# Patient Record
Sex: FEMALE | Race: WHITE | Employment: PART TIME | URBAN - METROPOLITAN AREA
[De-identification: names, ages, dates, MRNs, and addresses within clinical notes are randomized per-mention and may not be internally consistent; named-entity substitution may affect disease eponyms.]

---

## 2019-09-24 ENCOUNTER — HOSPITAL ENCOUNTER (OUTPATIENT)
Dept: LAB | Age: 69
Discharge: HOME | End: 2019-09-24

## 2019-09-24 PROCEDURE — 88305 TISSUE EXAM BY PATHOLOGIST: CPT

## 2021-10-26 ENCOUNTER — HOSPITAL ENCOUNTER (EMERGENCY)
Age: 71
Discharge: HOME | End: 2021-10-26
Attending: EMERGENCY MEDICINE
Payer: MEDICARE

## 2021-10-26 ENCOUNTER — APPOINTMENT (OUTPATIENT)
Dept: GENERAL RADIOLOGY | Age: 71
End: 2021-10-26
Attending: EMERGENCY MEDICINE
Payer: MEDICARE

## 2021-10-26 VITALS
SYSTOLIC BLOOD PRESSURE: 122 MMHG | DIASTOLIC BLOOD PRESSURE: 58 MMHG | TEMPERATURE: 98.3 F | RESPIRATION RATE: 16 BRPM | OXYGEN SATURATION: 96 % | HEART RATE: 64 BPM | HEIGHT: 67 IN | BODY MASS INDEX: 20.88 KG/M2 | WEIGHT: 133 LBS

## 2021-10-26 DIAGNOSIS — R07.9 CHEST PAIN, UNSPECIFIED TYPE: Primary | ICD-10-CM

## 2021-10-26 LAB
ALBUMIN SERPL-MCNC: 3.3 G/DL (ref 3.2–4.6)
ALBUMIN/GLOB SERPL: 0.8 {RATIO} (ref 1.2–3.5)
ALP SERPL-CCNC: 58 U/L (ref 50–130)
ALT SERPL-CCNC: 26 U/L (ref 12–65)
ANION GAP SERPL CALC-SCNC: 6 MMOL/L (ref 7–16)
AST SERPL-CCNC: 45 U/L (ref 15–37)
ATRIAL RATE: 58 BPM
BASOPHILS # BLD: 0 K/UL (ref 0–0.2)
BASOPHILS NFR BLD: 0 % (ref 0–2)
BILIRUB SERPL-MCNC: 0.5 MG/DL (ref 0.2–1.1)
BUN SERPL-MCNC: 13 MG/DL (ref 8–23)
CALCIUM SERPL-MCNC: 8.9 MG/DL (ref 8.3–10.4)
CALCULATED P AXIS, ECG09: 73 DEGREES
CALCULATED R AXIS, ECG10: 77 DEGREES
CALCULATED T AXIS, ECG11: 71 DEGREES
CHLORIDE SERPL-SCNC: 106 MMOL/L (ref 98–107)
CO2 SERPL-SCNC: 29 MMOL/L (ref 21–32)
CREAT SERPL-MCNC: 0.85 MG/DL (ref 0.6–1)
DIAGNOSIS, 93000: NORMAL
DIFFERENTIAL METHOD BLD: ABNORMAL
EOSINOPHIL # BLD: 0.1 K/UL (ref 0–0.8)
EOSINOPHIL NFR BLD: 1 % (ref 0.5–7.8)
ERYTHROCYTE [DISTWIDTH] IN BLOOD BY AUTOMATED COUNT: 11.5 % (ref 11.9–14.6)
GLOBULIN SER CALC-MCNC: 4 G/DL (ref 2.3–3.5)
GLUCOSE SERPL-MCNC: 103 MG/DL (ref 65–100)
HCT VFR BLD AUTO: 40.3 % (ref 35.8–46.3)
HGB BLD-MCNC: 13.9 G/DL (ref 11.7–15.4)
IMM GRANULOCYTES # BLD AUTO: 0 K/UL (ref 0–0.5)
IMM GRANULOCYTES NFR BLD AUTO: 0 % (ref 0–5)
LIPASE SERPL-CCNC: 165 U/L (ref 73–393)
LYMPHOCYTES # BLD: 1.1 K/UL (ref 0.5–4.6)
LYMPHOCYTES NFR BLD: 22 % (ref 13–44)
MAGNESIUM SERPL-MCNC: 2.2 MG/DL (ref 1.8–2.4)
MCH RBC QN AUTO: 33.3 PG (ref 26.1–32.9)
MCHC RBC AUTO-ENTMCNC: 34.5 G/DL (ref 31.4–35)
MCV RBC AUTO: 96.6 FL (ref 79.6–97.8)
MONOCYTES # BLD: 0.5 K/UL (ref 0.1–1.3)
MONOCYTES NFR BLD: 11 % (ref 4–12)
NEUTS SEG # BLD: 3.3 K/UL (ref 1.7–8.2)
NEUTS SEG NFR BLD: 66 % (ref 43–78)
NRBC # BLD: 0 K/UL (ref 0–0.2)
P-R INTERVAL, ECG05: 200 MS
PLATELET # BLD AUTO: 262 K/UL (ref 150–450)
PMV BLD AUTO: 9.7 FL (ref 9.4–12.3)
POTASSIUM SERPL-SCNC: 4.2 MMOL/L (ref 3.5–5.1)
PROT SERPL-MCNC: 7.3 G/DL (ref 6.3–8.2)
Q-T INTERVAL, ECG07: 396 MS
QRS DURATION, ECG06: 92 MS
QTC CALCULATION (BEZET), ECG08: 388 MS
RBC # BLD AUTO: 4.17 M/UL (ref 4.05–5.2)
SODIUM SERPL-SCNC: 141 MMOL/L (ref 136–145)
TROPONIN-HIGH SENSITIVITY: 9 PG/ML (ref 0–14)
TROPONIN-HIGH SENSITIVITY: 9.4 PG/ML (ref 0–14)
VENTRICULAR RATE, ECG03: 58 BPM
WBC # BLD AUTO: 4.9 K/UL (ref 4.3–11.1)

## 2021-10-26 PROCEDURE — 83690 ASSAY OF LIPASE: CPT

## 2021-10-26 PROCEDURE — 83735 ASSAY OF MAGNESIUM: CPT

## 2021-10-26 PROCEDURE — 93005 ELECTROCARDIOGRAM TRACING: CPT

## 2021-10-26 PROCEDURE — 84484 ASSAY OF TROPONIN QUANT: CPT

## 2021-10-26 PROCEDURE — 71045 X-RAY EXAM CHEST 1 VIEW: CPT

## 2021-10-26 PROCEDURE — 94762 N-INVAS EAR/PLS OXIMTRY CONT: CPT

## 2021-10-26 PROCEDURE — 99284 EMERGENCY DEPT VISIT MOD MDM: CPT

## 2021-10-26 PROCEDURE — 85025 COMPLETE CBC W/AUTO DIFF WBC: CPT

## 2021-10-26 PROCEDURE — 80053 COMPREHEN METABOLIC PANEL: CPT

## 2021-10-26 RX ORDER — SODIUM CHLORIDE 0.9 % (FLUSH) 0.9 %
5-10 SYRINGE (ML) INJECTION AS NEEDED
Status: DISCONTINUED | OUTPATIENT
Start: 2021-10-26 | End: 2021-10-26 | Stop reason: HOSPADM

## 2021-10-26 RX ORDER — SODIUM CHLORIDE 0.9 % (FLUSH) 0.9 %
5-10 SYRINGE (ML) INJECTION EVERY 8 HOURS
Status: DISCONTINUED | OUTPATIENT
Start: 2021-10-26 | End: 2021-10-26 | Stop reason: HOSPADM

## 2021-10-26 RX ORDER — MONTELUKAST SODIUM 4 MG/1
1 TABLET, CHEWABLE ORAL DAILY
COMMUNITY

## 2021-10-26 RX ORDER — NITROGLYCERIN 0.4 MG/1
0.4 TABLET SUBLINGUAL
Status: DISCONTINUED | OUTPATIENT
Start: 2021-10-26 | End: 2021-10-26 | Stop reason: HOSPADM

## 2021-10-26 NOTE — ED PROVIDER NOTES
[address] Kathleen Alvarez is a 73 y.o. female seen on 10/26/2021 in the [facility] EMERGENCY DEPT in room ER01/01. Chief Complaint   Patient presents with    Chest Pain     HPI: 70-year-old  female presented to the emergency department for evaluation with complaints of chest pain that began approximately 3 hours prior to arrival.  Patient states that the pain awoke her this morning and it was in the center of her chest and radiating to her back. She did start to hyperventilate secondary to the pain. She denies being short of breath. She denies any nausea, sweating, shortness of breath, vomiting, abdominal pain or any other associated symptoms with the chest pain that she had. She did call EMS at that time and they evaluated patient. Patient was given 324 aspirin and 1 nitroglycerin with relief of her pain. She was encouraged to come to the emergency department however declined transport. She called her family doctor to be seen and they recommended that the patient come to the emergency department for further evaluation. Patient has had similar episodes in the past but it has been years ago. She is unsure what the etiology of her symptoms were previously. She does not have a history of CAD nor family history. She has no other complaints at this time. She is chest pain-free currently. Historian: Patient    REVIEW OF SYSTEMS     Review of Systems   Constitutional: Negative. HENT: Negative. Respiratory: Negative. Cardiovascular: Positive for chest pain. Gastrointestinal: Negative. Genitourinary: Negative. Musculoskeletal: Negative. Skin: Negative. Neurological: Negative. Psychiatric/Behavioral: Negative. All other systems reviewed and are negative.       PAST MEDICAL HISTORY     Past Medical History:   Diagnosis Date    Gastrointestinal disorder     diverticulitis    Other ill-defined conditions     chronic back pain     Past Surgical History:   Procedure Laterality Date    ABDOMEN SURGERY PROC UNLISTED      part of colon removed    HX GI      part of colon removed    HX HYSTERECTOMY       Social History     Socioeconomic History    Marital status:      Spouse name: Not on file    Number of children: Not on file    Years of education: Not on file    Highest education level: Not on file   Tobacco Use    Smoking status: Never Smoker    Smokeless tobacco: Never Used   Substance and Sexual Activity    Alcohol use: Yes     Comment: glass a wine a night    Drug use: No     Social Determinants of Health     Financial Resource Strain:     Difficulty of Paying Living Expenses:    Food Insecurity:     Worried About Running Out of Food in the Last Year:     [address] in the Last Year:    Transportation Needs:     Lack of Transportation (Medical):  Lack of Transportation (Non-Medical):    Physical Activity:     Days of Exercise per Week:     Minutes of Exercise per Session:    Stress:     Feeling of Stress :    Social Connections:     Frequency of Communication with Friends and Family:     Frequency of Social Gatherings with Friends and Family:     Attends [employer] Services:     Active Member of Clubs or Organizations:     Attends Club or Organization Meetings:     Marital Status:      Prior to Admission Medications   Prescriptions Last Dose Informant Patient Reported? Taking?   estradiol (ESTRACE) 1 mg tablet   Yes No   Sig: Take 1 mg by mouth daily. eszopiclone (LUNESTA) 1 mg tablet   Yes No   Sig: Take 1 mg by mouth nightly.   gabapentin (NEURONTIN) 300 mg tablet   Yes No   Sig: Take  by mouth three (3) times daily. meloxicam (MOBIC) 15 mg tablet   Yes No   Sig: Take 15 mg by mouth daily.         Facility-Administered Medications: None     Allergies   Allergen Reactions    Other Medication Other (comments)     Patient stated \"Blue Sutures\"    Penicillins Other (comments)     unsure        PHYSICAL EXAM       Vitals:    10/26/21 1138 10/26/21 1218 10/26/21 1318 10/26/21 1400   BP: (!) 107/55 (!) 119/56 (!) 120/57 (!) 122/58   Pulse: 60 (!) 58 (!) 56 64   Resp:    16   Temp:    98.3 °F (36.8 °C)   SpO2: 95% 95% 94% 96%    Vital signs were reviewed. Physical Exam  Vitals and nursing note reviewed. Constitutional:       General: She is not in acute distress. Appearance: She is well-developed. She is not ill-appearing or toxic-appearing. HENT:      Head: Normocephalic and atraumatic. Cardiovascular:      Rate and Rhythm: Normal rate and regular rhythm. Heart sounds: Normal heart sounds. Pulmonary:      Effort: Pulmonary effort is normal.      Breath sounds: Normal breath sounds. Chest:      Chest wall: No tenderness. Abdominal:      Palpations: Abdomen is soft. Tenderness: There is no abdominal tenderness. Musculoskeletal:         General: Normal range of motion. Right lower leg: No tenderness. No edema. Left lower leg: No tenderness. No edema. Skin:     General: Skin is warm. Neurological:      General: No focal deficit present. Mental Status: She is alert and oriented to person, place, and time.    Psychiatric:         Mood and Affect: Mood normal.         Behavior: Behavior normal.          MEDICAL DECISION MAKING     ED Course:    Orders Placed This Encounter    XR CHEST PORT    Troponin - High Sensitivity    Troponin 2 Hour Repeat    CBC    CMP    LIPASE    Magnesium    Cardiac Monitoring    PULSE OXIMETRY CONTINUOUS    NURSING-MISCELLANEOUS: Please draw blue top tube and send to lab ONE TIME    EKG    SALINE LOCK IV ONE TIME Routine    INSERT PERIPHERAL IV ONE TIME STAT    sodium chloride (NS) flush 5-10 mL    sodium chloride (NS) flush 5-10 mL    nitroglycerin (NITROSTAT) tablet 0.4 mg    colestipoL (COLESTID) 1 gram tablet     Recent Results (from the past 8 hour(s))   EKG, 12 LEAD, INITIAL    Collection Time: 10/26/21 10:35 AM   Result Value Ref Range    Ventricular Rate 58 BPM    Atrial Rate 58 BPM    P-R Interval 200 ms    QRS Duration 92 ms    Q-T Interval 396 ms    QTC Calculation (Bezet) 388 ms    Calculated P Axis 73 degrees    Calculated R Axis 77 degrees    Calculated T Axis 71 degrees    Diagnosis       Sinus bradycardia  RSR' or QR pattern in V1 suggests right ventricular conduction delay  Borderline ECG  When compared with ECG of 22-AUG-2013 16:35,  No significant change was found  Confirmed by Saad Child ([id number]) on 10/26/2021 1:44:17 PM     TROPONIN-HIGH SENSITIVITY    Collection Time: 10/26/21 10:38 AM   Result Value Ref Range    Troponin-High Sensitivity 9.4 0 - 14 pg/mL   CBC WITH AUTOMATED DIFF    Collection Time: 10/26/21 10:38 AM   Result Value Ref Range    WBC 4.9 4.3 - 11.1 K/uL    RBC 4.17 4.05 - 5.2 M/uL    HGB 13.9 11.7 - 15.4 g/dL    HCT 40.3 35.8 - 46.3 %    MCV 96.6 79.6 - 97.8 FL    MCH 33.3 (H) 26.1 - 32.9 PG    MCHC 34.5 31.4 - 35.0 g/dL    RDW 11.5 (L) 11.9 - 14.6 %    PLATELET 897 226 - 759 K/uL    MPV 9.7 9.4 - 12.3 FL    ABSOLUTE NRBC 0.00 0.0 - 0.2 K/uL    DF AUTOMATED      NEUTROPHILS 66 43 - 78 %    LYMPHOCYTES 22 13 - 44 %    MONOCYTES 11 4.0 - 12.0 %    EOSINOPHILS 1 0.5 - 7.8 %    BASOPHILS 0 0.0 - 2.0 %    IMMATURE GRANULOCYTES 0 0.0 - 5.0 %    ABS. NEUTROPHILS 3.3 1.7 - 8.2 K/UL    ABS. LYMPHOCYTES 1.1 0.5 - 4.6 K/UL    ABS. MONOCYTES 0.5 0.1 - 1.3 K/UL    ABS. EOSINOPHILS 0.1 0.0 - 0.8 K/UL    ABS. BASOPHILS 0.0 0.0 - 0.2 K/UL    ABS. IMM.  GRANS. 0.0 0.0 - 0.5 K/UL   METABOLIC PANEL, COMPREHENSIVE    Collection Time: 10/26/21 10:38 AM   Result Value Ref Range    Sodium 141 136 - 145 mmol/L    Potassium 4.2 3.5 - 5.1 mmol/L    Chloride 106 98 - 107 mmol/L    CO2 29 21 - 32 mmol/L    Anion gap 6 (L) 7 - 16 mmol/L    Glucose 103 (H) 65 - 100 mg/dL    BUN 13 8 - 23 MG/DL    Creatinine 0.85 0.6 - 1.0 MG/DL    GFR est AA >60 >60 ml/min/1.73m2    GFR est non-AA >60 >60 ml/min/1.73m2    Calcium 8.9 8.3 - 10.4 MG/DL    Bilirubin, total 0.5 0.2 - 1.1 MG/DL    ALT (SGPT) 26 12 - 65 U/L    AST (SGOT) 45 (H) 15 - 37 U/L    Alk. phosphatase 58 50 - 130 U/L    Protein, total 7.3 6.3 - 8.2 g/dL    Albumin 3.3 3.2 - 4.6 g/dL    Globulin 4.0 (H) 2.3 - 3.5 g/dL    A-G Ratio 0.8 (L) 1.2 - 3.5     LIPASE    Collection Time: 10/26/21 10:38 AM   Result Value Ref Range    Lipase 165 73 - 393 U/L   MAGNESIUM    Collection Time: 10/26/21 10:38 AM   Result Value Ref Range    Magnesium 2.2 1.8 - 2.4 mg/dL   TROPONIN-HIGH SENSITIVITY    Collection Time: 10/26/21 12:51 PM   Result Value Ref Range    Troponin-High Sensitivity 9.0 0 - 14 pg/mL     XR CHEST PORT    Result Date: 10/26/2021  AP PORTABLE CHEST X-RAY 10/26/2021 2:01 PM HISTORY: CP  ER 1:-Mid-sternal chest pain radiating into the back since this morning COMPARISON: June 8, 2012 FINDINGS:  EKG leads are present. There is no lobar consolidation, pleural effusions or pulmonary edema. No consolidation. EKG interpretation personally: Rate 58. Sinus bradycardia. Borderline [place] interval.  Normal QT interval.      MDM  Number of Diagnoses or Management Options  Diagnosis management comments: 59-year-old  female presented emergency department with complaints of chest pain that woke her up from sleep. Patient without recurrence of chest pain in the emergency department. Patient's EKG, labs and imaging are reassuring. Patient encouraged to follow-up with her family doctor for further work-up. He will return the emergency department for any concerns. Amount and/or Complexity of Data Reviewed  Clinical lab tests: reviewed  Tests in the radiology section of CPT®: ordered and reviewed  Independent visualization of images, tracings, or specimens: yes    Patient Progress  Patient progress: improved        Disposition: Discharged  Diagnosis:     ICD-10-CM ICD-9-CM   1.  Chest pain, unspecified type  R07.9 786.50 ____________________________________________________________________  A portion of this note was generated using voice recognition dictation software. While the note has been reviewed for accuracy, please note certain words and phrases may not be transcribed as intended and some grammatical and/or typographical errors may be present.

## 2021-10-26 NOTE — ED NOTES
I have reviewed discharge instructions with the patient and spouse. The patient and spouse verbalized understanding. Patient left ED via Discharge Method: ambulatory to Home with Van Thorne, her . Opportunity for questions and clarification provided. Patient given 0 scripts. To continue your aftercare when you leave the hospital, you may receive an automated call from our care team to check in on how you are doing. This is a free service and part of our promise to provide the best care and service to meet your aftercare needs.  If you have questions, or wish to unsubscribe from this service please call [phone]. Thank you for Choosing our [facility] Emergency Department.

## 2021-10-26 NOTE — ED TRIAGE NOTES
Pt states chest pain mid sternal radiating to the back started at 8 am. Was seen by EMS at 8:30 and was given 324 of baby aspirin and nitro. It made it feel better and declined transport. States chest pain returned 30 mins prior to arrival.  No Hx of heart problems. Flu shot yesterday.  Masked

## 2022-05-27 ENCOUNTER — HOSPITAL ENCOUNTER (INPATIENT)
Age: 72
LOS: 3 days | Discharge: HOME | DRG: 193 | End: 2022-05-30
Attending: EMERGENCY MEDICINE
Payer: MEDICARE

## 2022-05-27 ENCOUNTER — HOSPITAL ENCOUNTER (EMERGENCY)
Dept: CT IMAGING | Age: 72
Discharge: HOME | DRG: 193 | End: 2022-05-30
Payer: MEDICARE

## 2022-05-27 ENCOUNTER — HOSPITAL ENCOUNTER (EMERGENCY)
Dept: GENERAL RADIOLOGY | Age: 72
Discharge: HOME | DRG: 193 | End: 2022-05-30
Payer: MEDICARE

## 2022-05-27 DIAGNOSIS — J18.9 COMMUNITY ACQUIRED PNEUMONIA OF LEFT LOWER LOBE OF LUNG: Primary | ICD-10-CM

## 2022-05-27 DIAGNOSIS — J96.01 ACUTE HYPOXEMIC RESPIRATORY FAILURE (HCC): ICD-10-CM

## 2022-05-27 PROBLEM — J16.0 CAP (COMMUNITY ACQUIRED PNEUMONIA) DUE TO CHLAMYDIA SPECIES: Status: ACTIVE | Noted: 2022-05-27

## 2022-05-27 LAB
ALBUMIN SERPL-MCNC: 4.1 G/DL (ref 3.2–4.6)
ALBUMIN/GLOB SERPL: 0.9 {RATIO} (ref 1.2–3.5)
ALP SERPL-CCNC: 71 U/L (ref 50–130)
ALT SERPL-CCNC: 32 U/L (ref 12–65)
ANION GAP SERPL CALC-SCNC: 7 MMOL/L (ref 7–16)
AST SERPL-CCNC: 45 U/L (ref 15–37)
BASOPHILS # BLD: 0 K/UL (ref 0–0.2)
BASOPHILS NFR BLD: 0 % (ref 0–2)
BILIRUB SERPL-MCNC: 0.5 MG/DL (ref 0.2–1.1)
BUN SERPL-MCNC: 9 MG/DL (ref 8–23)
CALCIUM SERPL-MCNC: 9.5 MG/DL (ref 8.3–10.4)
CHLORIDE SERPL-SCNC: 102 MMOL/L (ref 98–107)
CO2 SERPL-SCNC: 30 MMOL/L (ref 21–32)
CREAT SERPL-MCNC: 0.88 MG/DL (ref 0.6–1)
D DIMER PPP FEU-MCNC: 0.81 UG/ML(FEU)
DIFFERENTIAL METHOD BLD: ABNORMAL
EOSINOPHIL # BLD: 0 K/UL (ref 0–0.8)
EOSINOPHIL NFR BLD: 0 % (ref 0.5–7.8)
ERYTHROCYTE [DISTWIDTH] IN BLOOD BY AUTOMATED COUNT: 11.5 % (ref 11.9–14.6)
GLOBULIN SER CALC-MCNC: 4.4 G/DL (ref 2.3–3.5)
GLUCOSE SERPL-MCNC: 80 MG/DL (ref 65–100)
HCT VFR BLD AUTO: 42.9 % (ref 35.8–46.3)
HGB BLD-MCNC: 14.7 G/DL (ref 11.7–15.4)
IMM GRANULOCYTES # BLD AUTO: 0 K/UL (ref 0–0.5)
IMM GRANULOCYTES NFR BLD AUTO: 0 % (ref 0–5)
LACTATE SERPL-SCNC: 0.7 MMOL/L (ref 0.4–2)
LYMPHOCYTES # BLD: 1 K/UL (ref 0.5–4.6)
LYMPHOCYTES NFR BLD: 21 % (ref 13–44)
MCH RBC QN AUTO: 32.6 PG (ref 26.1–32.9)
MCHC RBC AUTO-ENTMCNC: 34.3 G/DL (ref 31.4–35)
MCV RBC AUTO: 95.1 FL (ref 79.6–97.8)
MONOCYTES # BLD: 0.6 K/UL (ref 0.1–1.3)
MONOCYTES NFR BLD: 12 % (ref 4–12)
NEUTS SEG # BLD: 3.2 K/UL (ref 1.7–8.2)
NEUTS SEG NFR BLD: 66 % (ref 43–78)
NRBC # BLD: 0 K/UL (ref 0–0.2)
PLATELET # BLD AUTO: 266 K/UL (ref 150–450)
PMV BLD AUTO: 9.6 FL (ref 9.4–12.3)
POTASSIUM SERPL-SCNC: 3.6 MMOL/L (ref 3.5–5.1)
PROCALCITONIN SERPL-MCNC: <0.05 NG/ML (ref 0–0.49)
PROT SERPL-MCNC: 8.5 G/DL (ref 6.3–8.2)
RBC # BLD AUTO: 4.51 M/UL (ref 4.05–5.2)
SODIUM SERPL-SCNC: 139 MMOL/L (ref 136–145)
WBC # BLD AUTO: 4.9 K/UL (ref 4.3–11.1)

## 2022-05-27 PROCEDURE — 96374 THER/PROPH/DIAG INJ IV PUSH: CPT

## 2022-05-27 PROCEDURE — 94640 AIRWAY INHALATION TREATMENT: CPT

## 2022-05-27 PROCEDURE — 94760 N-INVAS EAR/PLS OXIMETRY 1: CPT

## 2022-05-27 PROCEDURE — 96365 THER/PROPH/DIAG IV INF INIT: CPT

## 2022-05-27 PROCEDURE — 1100000000 HC RM PRIVATE

## 2022-05-27 PROCEDURE — 71260 CT THORAX DX C+: CPT

## 2022-05-27 PROCEDURE — 94664 DEMO&/EVAL PT USE INHALER: CPT

## 2022-05-27 PROCEDURE — 6370000000 HC RX 637 (ALT 250 FOR IP): Performed by: FAMILY MEDICINE

## 2022-05-27 PROCEDURE — 80053 COMPREHEN METABOLIC PANEL: CPT

## 2022-05-27 PROCEDURE — 6360000004 HC RX CONTRAST MEDICATION: Performed by: EMERGENCY MEDICINE

## 2022-05-27 PROCEDURE — 87040 BLOOD CULTURE FOR BACTERIA: CPT

## 2022-05-27 PROCEDURE — 6370000000 HC RX 637 (ALT 250 FOR IP): Performed by: EMERGENCY MEDICINE

## 2022-05-27 PROCEDURE — 99285 EMERGENCY DEPT VISIT HI MDM: CPT

## 2022-05-27 PROCEDURE — 2580000003 HC RX 258: Performed by: FAMILY MEDICINE

## 2022-05-27 PROCEDURE — 85025 COMPLETE CBC W/AUTO DIFF WBC: CPT

## 2022-05-27 PROCEDURE — 83605 ASSAY OF LACTIC ACID: CPT

## 2022-05-27 PROCEDURE — 85379 FIBRIN DEGRADATION QUANT: CPT

## 2022-05-27 PROCEDURE — 6360000002 HC RX W HCPCS: Performed by: EMERGENCY MEDICINE

## 2022-05-27 PROCEDURE — 2580000003 HC RX 258: Performed by: EMERGENCY MEDICINE

## 2022-05-27 PROCEDURE — 84145 PROCALCITONIN (PCT): CPT

## 2022-05-27 PROCEDURE — 71046 X-RAY EXAM CHEST 2 VIEWS: CPT

## 2022-05-27 PROCEDURE — 93005 ELECTROCARDIOGRAM TRACING: CPT | Performed by: EMERGENCY MEDICINE

## 2022-05-27 PROCEDURE — 96375 TX/PRO/DX INJ NEW DRUG ADDON: CPT

## 2022-05-27 RX ORDER — 0.9 % SODIUM CHLORIDE 0.9 %
100 INTRAVENOUS SOLUTION INTRAVENOUS ONCE
Status: DISCONTINUED | OUTPATIENT
Start: 2022-05-27 | End: 2022-05-31 | Stop reason: HOSPADM

## 2022-05-27 RX ORDER — AZITHROMYCIN 250 MG/1
250 TABLET, FILM COATED ORAL DAILY
Status: DISCONTINUED | OUTPATIENT
Start: 2022-05-28 | End: 2022-05-30 | Stop reason: HOSPADM

## 2022-05-27 RX ORDER — CHOLESTYRAMINE LIGHT 4 G/5.7G
4 POWDER, FOR SUSPENSION ORAL DAILY
Status: DISCONTINUED | OUTPATIENT
Start: 2022-05-28 | End: 2022-05-29

## 2022-05-27 RX ORDER — FAMOTIDINE 20 MG/1
20 TABLET, FILM COATED ORAL 2 TIMES DAILY
Status: DISCONTINUED | OUTPATIENT
Start: 2022-05-27 | End: 2022-05-30 | Stop reason: HOSPADM

## 2022-05-27 RX ORDER — ONDANSETRON 4 MG/1
4 TABLET, ORALLY DISINTEGRATING ORAL EVERY 8 HOURS PRN
Status: DISCONTINUED | OUTPATIENT
Start: 2022-05-27 | End: 2022-05-30 | Stop reason: HOSPADM

## 2022-05-27 RX ORDER — SODIUM CHLORIDE 0.9 % (FLUSH) 0.9 %
5-40 SYRINGE (ML) INJECTION EVERY 12 HOURS SCHEDULED
Status: DISCONTINUED | OUTPATIENT
Start: 2022-05-27 | End: 2022-05-30 | Stop reason: HOSPADM

## 2022-05-27 RX ORDER — MORPHINE SULFATE 4 MG/ML
4 INJECTION INTRAVENOUS EVERY 4 HOURS PRN
Status: DISCONTINUED | OUTPATIENT
Start: 2022-05-27 | End: 2022-05-30 | Stop reason: HOSPADM

## 2022-05-27 RX ORDER — IPRATROPIUM BROMIDE AND ALBUTEROL SULFATE 2.5; .5 MG/3ML; MG/3ML
1 SOLUTION RESPIRATORY (INHALATION)
Status: COMPLETED | OUTPATIENT
Start: 2022-05-27 | End: 2022-05-27

## 2022-05-27 RX ORDER — SODIUM CHLORIDE, SODIUM LACTATE, POTASSIUM CHLORIDE, AND CALCIUM CHLORIDE .6; .31; .03; .02 G/100ML; G/100ML; G/100ML; G/100ML
1000 INJECTION, SOLUTION INTRAVENOUS ONCE
Status: COMPLETED | OUTPATIENT
Start: 2022-05-27 | End: 2022-05-27

## 2022-05-27 RX ORDER — SODIUM CHLORIDE 0.9 % (FLUSH) 0.9 %
5-40 SYRINGE (ML) INJECTION AS NEEDED
Status: CANCELLED | OUTPATIENT
Start: 2022-05-27

## 2022-05-27 RX ORDER — OXYCODONE HYDROCHLORIDE 5 MG/1
5 TABLET ORAL EVERY 4 HOURS PRN
Status: DISCONTINUED | OUTPATIENT
Start: 2022-05-27 | End: 2022-05-29

## 2022-05-27 RX ORDER — SODIUM CHLORIDE 0.9 % (FLUSH) 0.9 %
5-40 SYRINGE (ML) INJECTION PRN
Status: DISCONTINUED | OUTPATIENT
Start: 2022-05-27 | End: 2022-05-30 | Stop reason: HOSPADM

## 2022-05-27 RX ORDER — POTASSIUM CHLORIDE 7.45 MG/ML
10 INJECTION INTRAVENOUS PRN
Status: DISCONTINUED | OUTPATIENT
Start: 2022-05-27 | End: 2022-05-30 | Stop reason: HOSPADM

## 2022-05-27 RX ORDER — ONDANSETRON 2 MG/ML
4 INJECTION INTRAMUSCULAR; INTRAVENOUS EVERY 6 HOURS PRN
Status: DISCONTINUED | OUTPATIENT
Start: 2022-05-27 | End: 2022-05-30 | Stop reason: HOSPADM

## 2022-05-27 RX ORDER — POLYETHYLENE GLYCOL 3350 17 G/17G
17 POWDER, FOR SOLUTION ORAL DAILY PRN
Status: DISCONTINUED | OUTPATIENT
Start: 2022-05-27 | End: 2022-05-30 | Stop reason: HOSPADM

## 2022-05-27 RX ORDER — METHYLPREDNISOLONE SODIUM SUCCINATE 125 MG/2ML
125 INJECTION, POWDER, LYOPHILIZED, FOR SOLUTION INTRAMUSCULAR; INTRAVENOUS
Status: COMPLETED | OUTPATIENT
Start: 2022-05-27 | End: 2022-05-27

## 2022-05-27 RX ORDER — ACETAMINOPHEN 325 MG/1
650 TABLET ORAL EVERY 6 HOURS PRN
Status: DISCONTINUED | OUTPATIENT
Start: 2022-05-27 | End: 2022-05-30 | Stop reason: HOSPADM

## 2022-05-27 RX ORDER — SODIUM CHLORIDE 0.9 % (FLUSH) 0.9 %
5-40 SYRINGE (ML) INJECTION EVERY 8 HOURS
Status: CANCELLED | OUTPATIENT
Start: 2022-05-27

## 2022-05-27 RX ORDER — PROCHLORPERAZINE MALEATE 10 MG
10 TABLET ORAL EVERY 6 HOURS PRN
Status: DISCONTINUED | OUTPATIENT
Start: 2022-05-27 | End: 2022-05-30 | Stop reason: HOSPADM

## 2022-05-27 RX ORDER — ZOLPIDEM TARTRATE 5 MG/1
5 TABLET ORAL NIGHTLY PRN
Status: DISCONTINUED | OUTPATIENT
Start: 2022-05-27 | End: 2022-05-29

## 2022-05-27 RX ORDER — MAGNESIUM SULFATE IN WATER 40 MG/ML
2000 INJECTION, SOLUTION INTRAVENOUS PRN
Status: DISCONTINUED | OUTPATIENT
Start: 2022-05-27 | End: 2022-05-30 | Stop reason: HOSPADM

## 2022-05-27 RX ORDER — SODIUM CHLORIDE 9 MG/ML
INJECTION, SOLUTION INTRAVENOUS PRN
Status: DISCONTINUED | OUTPATIENT
Start: 2022-05-27 | End: 2022-05-30 | Stop reason: HOSPADM

## 2022-05-27 RX ORDER — ACETAMINOPHEN 650 MG/1
650 SUPPOSITORY RECTAL EVERY 6 HOURS PRN
Status: DISCONTINUED | OUTPATIENT
Start: 2022-05-27 | End: 2022-05-30 | Stop reason: HOSPADM

## 2022-05-27 RX ORDER — OXYCODONE AND ACETAMINOPHEN 7.5; 325 MG/1; MG/1
1 TABLET ORAL
Status: COMPLETED | OUTPATIENT
Start: 2022-05-27 | End: 2022-05-27

## 2022-05-27 RX ORDER — SODIUM CHLORIDE 0.9 % (FLUSH) 0.9 %
5-40 SYRINGE (ML) INJECTION 2 TIMES DAILY
Status: DISCONTINUED | OUTPATIENT
Start: 2022-05-27 | End: 2022-05-31 | Stop reason: HOSPADM

## 2022-05-27 RX ORDER — GABAPENTIN 300 MG/1
300 CAPSULE ORAL 3 TIMES DAILY
Status: DISCONTINUED | OUTPATIENT
Start: 2022-05-27 | End: 2022-05-29

## 2022-05-27 RX ORDER — GUAIFENESIN 600 MG/1
600 TABLET, EXTENDED RELEASE ORAL 2 TIMES DAILY
Status: DISCONTINUED | OUTPATIENT
Start: 2022-05-27 | End: 2022-05-30 | Stop reason: HOSPADM

## 2022-05-27 RX ORDER — ENOXAPARIN SODIUM 100 MG/ML
40 INJECTION SUBCUTANEOUS DAILY
Status: DISCONTINUED | OUTPATIENT
Start: 2022-05-28 | End: 2022-05-30 | Stop reason: HOSPADM

## 2022-05-27 RX ORDER — HYDROCODONE BITARTRATE AND HOMATROPINE METHYLBROMIDE ORAL SOLUTION 5; 1.5 MG/5ML; MG/5ML
5 LIQUID ORAL EVERY 4 HOURS PRN
Status: DISCONTINUED | OUTPATIENT
Start: 2022-05-27 | End: 2022-05-30 | Stop reason: HOSPADM

## 2022-05-27 RX ORDER — POTASSIUM CHLORIDE 20 MEQ/1
40 TABLET, EXTENDED RELEASE ORAL PRN
Status: DISCONTINUED | OUTPATIENT
Start: 2022-05-27 | End: 2022-05-30 | Stop reason: HOSPADM

## 2022-05-27 RX ADMIN — CEFTRIAXONE 1000 MG: 1 INJECTION, POWDER, FOR SOLUTION INTRAMUSCULAR; INTRAVENOUS at 19:34

## 2022-05-27 RX ADMIN — GUAIFENESIN 600 MG: 600 TABLET, EXTENDED RELEASE ORAL at 22:45

## 2022-05-27 RX ADMIN — IPRATROPIUM BROMIDE AND ALBUTEROL SULFATE 1 AMPULE: .5; 3 SOLUTION RESPIRATORY (INHALATION) at 16:21

## 2022-05-27 RX ADMIN — METHYLPREDNISOLONE SODIUM SUCCINATE 125 MG: 125 INJECTION, POWDER, FOR SOLUTION INTRAMUSCULAR; INTRAVENOUS at 16:30

## 2022-05-27 RX ADMIN — GABAPENTIN 300 MG: 300 CAPSULE ORAL at 22:45

## 2022-05-27 RX ADMIN — ONDANSETRON 4 MG: 4 TABLET, ORALLY DISINTEGRATING ORAL at 21:46

## 2022-05-27 RX ADMIN — SODIUM CHLORIDE, POTASSIUM CHLORIDE, SODIUM LACTATE AND CALCIUM CHLORIDE 1000 ML: 600; 310; 30; 20 INJECTION, SOLUTION INTRAVENOUS at 16:30

## 2022-05-27 RX ADMIN — AZITHROMYCIN MONOHYDRATE 500 MG: 500 INJECTION, POWDER, LYOPHILIZED, FOR SOLUTION INTRAVENOUS at 19:55

## 2022-05-27 RX ADMIN — PROCHLORPERAZINE MALEATE 10 MG: 10 TABLET ORAL at 23:25

## 2022-05-27 RX ADMIN — OXYCODONE HYDROCHLORIDE AND ACETAMINOPHEN 1 TABLET: 7.5; 325 TABLET ORAL at 18:33

## 2022-05-27 RX ADMIN — IOPAMIDOL 100 ML: 755 INJECTION, SOLUTION INTRAVENOUS at 18:03

## 2022-05-27 RX ADMIN — SODIUM CHLORIDE, PRESERVATIVE FREE 10 ML: 5 INJECTION INTRAVENOUS at 21:50

## 2022-05-27 RX ADMIN — FAMOTIDINE 20 MG: 20 TABLET, FILM COATED ORAL at 22:45

## 2022-05-27 ASSESSMENT — PAIN DESCRIPTION - PAIN TYPE
TYPE: CHRONIC PAIN
TYPE: ACUTE PAIN

## 2022-05-27 ASSESSMENT — PAIN SCALES - GENERAL
PAINLEVEL_OUTOF10: 6
PAINLEVEL_OUTOF10: 3
PAINLEVEL_OUTOF10: 10

## 2022-05-27 ASSESSMENT — ENCOUNTER SYMPTOMS
SHORTNESS OF BREATH: 1
DIARRHEA: 0
ABDOMINAL PAIN: 0
COUGH: 1
COLOR CHANGE: 0
STRIDOR: 0
NAUSEA: 0
VOMITING: 0

## 2022-05-27 ASSESSMENT — PAIN DESCRIPTION - ORIENTATION
ORIENTATION: OTHER (COMMENT)
ORIENTATION: LEFT

## 2022-05-27 ASSESSMENT — PAIN DESCRIPTION - FREQUENCY
FREQUENCY: CONTINUOUS
FREQUENCY: CONTINUOUS

## 2022-05-27 ASSESSMENT — PAIN - FUNCTIONAL ASSESSMENT: PAIN_FUNCTIONAL_ASSESSMENT: 0-10

## 2022-05-27 ASSESSMENT — PAIN DESCRIPTION - LOCATION
LOCATION: GENERALIZED
LOCATION: CHEST

## 2022-05-27 ASSESSMENT — PAIN DESCRIPTION - ONSET
ONSET: ON-GOING
ONSET: GRADUAL

## 2022-05-27 ASSESSMENT — PAIN DESCRIPTION - DESCRIPTORS
DESCRIPTORS: ACHING
DESCRIPTORS: CRAMPING;ACHING

## 2022-05-27 ASSESSMENT — LIFESTYLE VARIABLES: HOW OFTEN DO YOU HAVE A DRINK CONTAINING ALCOHOL: NEVER

## 2022-05-27 NOTE — ED TRIAGE NOTES
Pt states that she has had a cough for approx 1 week. Seen and treated at Urgent care on Wednesday for bronchitis. Given zithrmax and tessalon perles. States that she is no better and having left lower rib pain. O2 sats have been decreased.   Negative COVID and Flu test.  Masked on arrival

## 2022-05-27 NOTE — ED PROVIDER NOTES
[facility] Emergency Department Provider Note                   PCP:                Kale Benitez MD               Age: 73 y.o. Sex: female       ICD-10-CM    1. Community acquired pneumonia of left lower lobe of lung  J18.9    2. Acute hypoxemic respiratory failure (HCC)  J96.01        DISPOSITION Decision To Admit 05/27/2022 07:16:25 PM       New Prescriptions    No medications on file       Orders Placed This Encounter   Procedures    Culture, Blood 1    Culture, Blood 1    XR CHEST (2 VW)    CT CHEST PULMONARY EMBOLISM W CONTRAST    CBC with Auto Differential    Comprehensive Metabolic Panel    D-Dimer, Quantitative    Lactic Acid    Procalcitonin    EKG 12 Lead        MDM  Number of Diagnoses or Management Options  Acute hypoxemic respiratory failure ([address]  Community acquired pneumonia of left lower lobe of lung  Diagnosis management comments: Patient became hypoxic on       Amount and/or Complexity of Data Reviewed  Clinical lab tests: reviewed and ordered  Tests in the radiology section of CPT®: reviewed and ordered         Rachel Haskins is a 73 y.o. female who presents to the Emergency Department with chief complaint of    Chief Complaint   Patient presents with    Shortness of Breath      Patient has been sick for about 5 days now. She reports that she is just not feeling any better. Her pain is mainly in the left lower chest wall. She states it hurts very bad and she coughs there. She has had other family members sick as well. All of the family has tested negative for COVID and she tested negative for COVID and flu at urgent care. She was prescribed Tessalon Perles as well as a Z-Edvin which she is on day 3 from. She feels somewhat weak and lightheaded when standing. And just continues to have headaches and myalgias. The history is provided by the patient and the spouse. All other systems reviewed and are negative. Review of Systems   Constitutional:  Positive for chills and fatigue. Negative for activity change and diaphoresis. Respiratory:  Positive for cough and shortness of breath. Negative for stridor. Cardiovascular:  Positive for chest pain. Negative for palpitations. Gastrointestinal:  Negative for abdominal pain, diarrhea, nausea and vomiting. Musculoskeletal:  Negative for arthralgias, gait problem, neck pain and neck stiffness. Skin:  Negative for color change, rash and wound. Neurological:  Positive for weakness. Negative for tremors, seizures, facial asymmetry, light-headedness, numbness and headaches. Past Medical History:   Diagnosis Date    Gastrointestinal disorder     diverticulitis    Other ill-defined conditions(799.89)     chronic back pain        Past Surgical History:   Procedure Laterality Date    GI      part of colon removed    HYSTERECTOMY      MS ABDOMEN SURGERY PROC UNLISTED      part of colon removed        History reviewed. No pertinent family history. Social Connections:     Frequency of Communication with Friends and Family: Not on file    Frequency of Social Gatherings with Friends and Family: Not on file    Attends Church Services: Not on file    Active Member of Clubs or Organizations: Not on file    Attends Club or Organization Meetings: Not on file    Marital Status: Not on file        Allergies   Allergen Reactions    Penicillins Other (See Comments)     unsure        Vitals signs and nursing note reviewed. Patient Vitals for the past 4 hrs:   Temp Pulse Resp BP SpO2   05/27/22 1904 -- -- -- -- (!) 87 %   05/27/22 1759 98.8 °F (37.1 °C) 95 16 121/71 97 %   05/27/22 1621 -- 92 15 -- 94 %          Physical Exam  Vitals and nursing note reviewed. Constitutional:       General: She is not in acute distress. Appearance: She is well-developed. She is not ill-appearing, toxic-appearing or diaphoretic. Comments: Then she appears that she does not feel well. HENT:      Head: Normocephalic and atraumatic.    Eyes: General: No scleral icterus. Conjunctiva/sclera: Conjunctivae normal.   Cardiovascular:      Rate and Rhythm: Normal rate and regular rhythm. Pulmonary:      Effort: Pulmonary effort is normal. No respiratory distress. Breath sounds: No stridor. No decreased breath sounds, wheezing, rhonchi or rales. Comments: Intermittent coughing    Abdominal:      Palpations: Abdomen is soft. Tenderness: There is abdominal tenderness (under the left lateral breast.). There is no guarding or rebound. Hernia: No hernia is present. Musculoskeletal:      Cervical back: Normal range of motion and neck supple. Skin:     Capillary Refill: Capillary refill takes less than 2 seconds. Neurological:      General: No focal deficit present. Mental Status: She is alert and oriented to person, place, and time. Mental status is at baseline.    Psychiatric:         Mood and Affect: Mood normal.         Behavior: Behavior normal.        Procedures  ===================================  ED EKG Interpretation  EKG was interpreted in the absence of a cardiologist.    Rate: 86  EKG Interpretation: EKG Interpretation: sinus rhythm and incomplete right bundle branch block  ST Segments: Normal ST segments - NO STEMI    Maria A Sullivan MD 11:10 AM      Labs Reviewed   CBC WITH AUTO DIFFERENTIAL - Abnormal; Notable for the following components:       Result Value    RDW 11.5 (*)     Eosinophils % 0 (*)     All other components within normal limits   COMPREHENSIVE METABOLIC PANEL - Abnormal; Notable for the following components:    AST 45 (*)     Total Protein 8.5 (*)     Globulin 4.4 (*)     Albumin/Globulin Ratio 0.9 (*)     All other components within normal limits   D-DIMER, QUANTITATIVE - Abnormal; Notable for the following components:    D-Dimer, Quant 0.81 (*)     All other components within normal limits   CULTURE, BLOOD 1   CULTURE, BLOOD 1   LACTIC ACID   PROCALCITONIN        CT CHEST PULMONARY EMBOLISM W CONTRAST Final Result      1. No evidence of pulmonary embolic disease. 2. Left lower lobe infiltrative changes consistent with pneumonia. Follow-up to   resolution is recommended. CPT code(s) 29860                  XR CHEST (2 VW)   Final Result   Unremarkable two-view chest.                     [place] Coma Scale  Eye Opening: Spontaneous  Best Verbal Response: Oriented  Best Motor Response: Obeys commands  [place] Coma Scale Score: 15                     Voice dictation software was used during the making of this note. This software is not perfect and grammatical and other typographical errors may be present. This note has not been completely proofread for errors.      Chris Flaherty MD  05/27/22 3188       Chris Flaherty MD  11/10/22 5165

## 2022-05-28 PROBLEM — K58.9 IBS (IRRITABLE BOWEL SYNDROME): Status: ACTIVE | Noted: 2022-05-28

## 2022-05-28 PROBLEM — J96.01 ACUTE RESPIRATORY FAILURE WITH HYPOXIA (HCC): Status: ACTIVE | Noted: 2022-05-28

## 2022-05-28 PROBLEM — G62.9 NEUROPATHY: Status: ACTIVE | Noted: 2022-05-28

## 2022-05-28 LAB
ANION GAP SERPL CALC-SCNC: 5 MMOL/L (ref 7–16)
BASOPHILS # BLD: 0 K/UL (ref 0–0.2)
BASOPHILS NFR BLD: 0 % (ref 0–2)
BUN SERPL-MCNC: 9 MG/DL (ref 8–23)
CALCIUM SERPL-MCNC: 9 MG/DL (ref 8.3–10.4)
CHLORIDE SERPL-SCNC: 106 MMOL/L (ref 98–107)
CO2 SERPL-SCNC: 28 MMOL/L (ref 21–32)
CREAT SERPL-MCNC: 0.74 MG/DL (ref 0.6–1)
DIFFERENTIAL METHOD BLD: ABNORMAL
EOSINOPHIL # BLD: 0 K/UL (ref 0–0.8)
EOSINOPHIL NFR BLD: 0 % (ref 0.5–7.8)
ERYTHROCYTE [DISTWIDTH] IN BLOOD BY AUTOMATED COUNT: 11.3 % (ref 11.9–14.6)
GLUCOSE SERPL-MCNC: 130 MG/DL (ref 65–100)
HCT VFR BLD AUTO: 34.7 % (ref 35.8–46.3)
HGB BLD-MCNC: 12 G/DL (ref 11.7–15.4)
IMM GRANULOCYTES # BLD AUTO: 0 K/UL (ref 0–0.5)
IMM GRANULOCYTES NFR BLD AUTO: 0 % (ref 0–5)
LYMPHOCYTES # BLD: 0.8 K/UL (ref 0.5–4.6)
LYMPHOCYTES NFR BLD: 18 % (ref 13–44)
MCH RBC QN AUTO: 32.4 PG (ref 26.1–32.9)
MCHC RBC AUTO-ENTMCNC: 34.6 G/DL (ref 31.4–35)
MCV RBC AUTO: 93.8 FL (ref 79.6–97.8)
MONOCYTES # BLD: 0.2 K/UL (ref 0.1–1.3)
MONOCYTES NFR BLD: 4 % (ref 4–12)
NEUTS SEG # BLD: 3.4 K/UL (ref 1.7–8.2)
NEUTS SEG NFR BLD: 77 % (ref 43–78)
NRBC # BLD: 0 K/UL (ref 0–0.2)
PLATELET # BLD AUTO: 244 K/UL (ref 150–450)
PMV BLD AUTO: 9.8 FL (ref 9.4–12.3)
POTASSIUM SERPL-SCNC: 4.1 MMOL/L (ref 3.5–5.1)
RBC # BLD AUTO: 3.7 M/UL (ref 4.05–5.2)
SODIUM SERPL-SCNC: 139 MMOL/L (ref 136–145)
WBC # BLD AUTO: 4.4 K/UL (ref 4.3–11.1)

## 2022-05-28 PROCEDURE — 85025 COMPLETE CBC W/AUTO DIFF WBC: CPT

## 2022-05-28 PROCEDURE — 80048 BASIC METABOLIC PNL TOTAL CA: CPT

## 2022-05-28 PROCEDURE — 6370000000 HC RX 637 (ALT 250 FOR IP): Performed by: INTERNAL MEDICINE

## 2022-05-28 PROCEDURE — 6370000000 HC RX 637 (ALT 250 FOR IP): Performed by: FAMILY MEDICINE

## 2022-05-28 PROCEDURE — 36415 COLL VENOUS BLD VENIPUNCTURE: CPT

## 2022-05-28 PROCEDURE — 6360000002 HC RX W HCPCS: Performed by: FAMILY MEDICINE

## 2022-05-28 PROCEDURE — 94760 N-INVAS EAR/PLS OXIMETRY 1: CPT

## 2022-05-28 PROCEDURE — 1100000000 HC RM PRIVATE

## 2022-05-28 PROCEDURE — 2580000003 HC RX 258: Performed by: FAMILY MEDICINE

## 2022-05-28 PROCEDURE — 2700000000 HC OXYGEN THERAPY PER DAY

## 2022-05-28 RX ORDER — FLUTICASONE PROPIONATE 50 MCG
1 SPRAY, SUSPENSION (ML) NASAL DAILY
Status: DISCONTINUED | OUTPATIENT
Start: 2022-05-28 | End: 2022-05-30 | Stop reason: HOSPADM

## 2022-05-28 RX ORDER — LIDOCAINE 4 G/G
1 PATCH TOPICAL DAILY
Status: DISCONTINUED | OUTPATIENT
Start: 2022-05-28 | End: 2022-05-30 | Stop reason: HOSPADM

## 2022-05-28 RX ADMIN — FAMOTIDINE 20 MG: 20 TABLET, FILM COATED ORAL at 20:29

## 2022-05-28 RX ADMIN — GUAIFENESIN 600 MG: 600 TABLET, EXTENDED RELEASE ORAL at 09:31

## 2022-05-28 RX ADMIN — ACETAMINOPHEN 650 MG: 325 TABLET, FILM COATED ORAL at 02:04

## 2022-05-28 RX ADMIN — FLUTICASONE PROPIONATE 1 SPRAY: 50 SPRAY, METERED NASAL at 16:07

## 2022-05-28 RX ADMIN — GABAPENTIN 300 MG: 300 CAPSULE ORAL at 20:40

## 2022-05-28 RX ADMIN — ACETAMINOPHEN 650 MG: 325 TABLET, FILM COATED ORAL at 23:32

## 2022-05-28 RX ADMIN — HYDROCODONE BITARTRATE AND HOMATROPINE METHYLBROMIDE 5 ML: 5; 1.5 SOLUTION ORAL at 09:31

## 2022-05-28 RX ADMIN — CEFTRIAXONE 1000 MG: 1 INJECTION, POWDER, FOR SOLUTION INTRAMUSCULAR; INTRAVENOUS at 20:32

## 2022-05-28 RX ADMIN — GABAPENTIN 300 MG: 300 CAPSULE ORAL at 09:31

## 2022-05-28 RX ADMIN — FAMOTIDINE 20 MG: 20 TABLET, FILM COATED ORAL at 09:31

## 2022-05-28 RX ADMIN — SODIUM CHLORIDE, PRESERVATIVE FREE 10 ML: 5 INJECTION INTRAVENOUS at 09:37

## 2022-05-28 RX ADMIN — ENOXAPARIN SODIUM 40 MG: 100 INJECTION SUBCUTANEOUS at 09:38

## 2022-05-28 RX ADMIN — AZITHROMYCIN MONOHYDRATE 250 MG: 250 TABLET ORAL at 09:30

## 2022-05-28 RX ADMIN — SODIUM CHLORIDE, PRESERVATIVE FREE 10 ML: 5 INJECTION INTRAVENOUS at 20:41

## 2022-05-28 RX ADMIN — HYDROCODONE BITARTRATE AND HOMATROPINE METHYLBROMIDE 5 ML: 5; 1.5 SOLUTION ORAL at 22:37

## 2022-05-28 RX ADMIN — GABAPENTIN 300 MG: 300 CAPSULE ORAL at 16:07

## 2022-05-28 RX ADMIN — CHOLESTYRAMINE 4 G: 4 POWDER, FOR SUSPENSION ORAL at 09:33

## 2022-05-28 RX ADMIN — GUAIFENESIN 600 MG: 600 TABLET, EXTENDED RELEASE ORAL at 20:29

## 2022-05-28 ASSESSMENT — PAIN SCALES - GENERAL
PAINLEVEL_OUTOF10: 2
PAINLEVEL_OUTOF10: 0
PAINLEVEL_OUTOF10: 0
PAINLEVEL_OUTOF10: 2

## 2022-05-28 ASSESSMENT — PAIN DESCRIPTION - DESCRIPTORS
DESCRIPTORS: ACHING
DESCRIPTORS: ACHING

## 2022-05-28 ASSESSMENT — PAIN DESCRIPTION - ORIENTATION
ORIENTATION: LEFT
ORIENTATION: LEFT

## 2022-05-28 ASSESSMENT — PAIN DESCRIPTION - LOCATION
LOCATION: FLANK
LOCATION: FLANK

## 2022-05-28 NOTE — H&P
Hospitalist History and Physical   Admit Date:  2022  3:17 PM   Name:  Harman Peng   Age:  73 y.o. Sex:  female  :  1950   MRN:  [id number]   Room:  [facility]/    Presenting Complaint: Shortness of Breath     Reason(s) for Admission: CAP    History of Present Illness:   Patient was discussed with the ER provider prior to seeing the patient. Harman Peng is a 73 y.o. female with no significant medical history, who presented with cough and shortness of breath. She reports she has been sick for about 5 days. She reports other family members have been sick as well. Everyone has tested negative for COVID including the patient. She is on day 3 of his Z-Edvin. She was also prescribed Tessalon Perles. She states she is not feeling any better and now is hurting from the cough. She additionally reports chills, fatigue/weakness, myalgias, and headaches. She denies fevers, NVD, abdominal pain. Review of Systems:  10 systems reviewed and negative except as noted in HPI.   Assessment & Plan:     Principal Problem:    Community acquired pneumonia of left lower lobe of lung  Plan: IV Rocephin    Zithromax to complete a 5-day course  Hycodan for cough  Supplemental oxygen as needed      Dispo/Discharge Planning:   Inpatient, anticipate 2 days    Diet: No diet orders on file  VTE ppx:  Lovenox  Code status: No Order full code    Hospital Problems           Last Modified POA    * (Principal) Community acquired pneumonia of left lower lobe of lung 2022 Yes          Past History:  Past Medical History:   Diagnosis Date    Gastrointestinal disorder     diverticulitis    Other ill-defined conditions(799.89)     chronic back pain     Past Surgical History:   Procedure Laterality Date    GI      part of colon removed    HYSTERECTOMY      RI ABDOMEN SURGERY PROC UNLISTED      part of colon removed      Allergies   Allergen Reactions    Penicillins Other (See Comments)     unsure      Social History     Tobacco Use    Smoking status: Never Smoker    Smokeless tobacco: Never Used   Substance Use Topics    Alcohol use: Yes        Family history reviewed and negative except as noted above. She reports of family history of heart disease  Immunization History   Administered Date(s) Administered    COVID-19, Pfizer Purple top, DILUTE for use, 12+ yrs, 30mcg/0.3mL dose 02/02/2021, 12/23/2021     Prior to Admit Medications:  Current Outpatient Medications   Medication Instructions    colestipol (COLESTID) 1 g, Oral, DAILY    estradiol (ESTRACE) 0.5 mg, Oral, DAILY    eszopiclone (LUNESTA) 2 mg, Oral    Gabapentin, Once-Daily, 300 MG TABS Oral, 3 TIMES DAILY         Objective:     Patient Vitals for the past 24 hrs:   Temp Pulse Resp BP SpO2   05/27/22 1930 -- -- -- -- 93 %   05/27/22 1904 -- -- -- -- (!) 87 %   05/27/22 1850 -- -- -- -- (!) 88 %   05/27/22 1833 -- -- -- -- (!) 89 %   05/27/22 1759 98.8 °F (37.1 °C) 95 16 121/71 94 %   05/27/22 1621 -- 92 15 -- 94 %   05/27/22 1407 99 °F (37.2 °C) 91 20 110/63 94 %       Estimated body mass index is 20.83 kg/m² as calculated from the following:    Height as of this encounter: 5' 7\" (1.702 m). Weight as of this encounter: 133 lb (60.3 kg). No intake or output data in the 24 hours ending 05/27/22 2020      Physical Exam:    Blood pressure 121/71, pulse 95, temperature 98.8 °F (37.1 °C), temperature source Oral, resp. rate 16, height 5' 7\" (1.702 m), weight 133 lb (60.3 kg), SpO2 93 %. General:    WD and WN, No apparent distress. Eyes:  PERRL; EOMI; sclera normal/non-icteric  HENT:  Normocephalic, without obvious abnormality; Oropharynx is clear with tacky mucous membranes   Neck:  No restricted ROM. Trachea midline   Resp:    Clear to auscultation bilaterally. Resp are even and unlabored  CVS/Heart: Regular rate and rhythm,  No LE edema    GI:    Soft, non-tender. Not distended.   Bowel sounds normal.     Musc/SK: Muscle strength is appropriate for age/condition; No cyanosis. No clubbing  Skin:  No rashes and normal coloration. Warm and dry.     Neurologic: CN II - XII are grossly intact - Eye exam as noted above; moves extremities equally  Psych: Alert and oriented x 4;  Judgement and insight are normal    I have reviewed ordered lab tests and independently visualized imaging below:    Last 24hr Labs:  Recent Results (from the past 24 hour(s))   EKG 12 Lead    Collection Time: 05/27/22  2:30 PM   Result Value Ref Range    Ventricular Rate 86 BPM    Atrial Rate 86 BPM    P-R Interval 192 ms    QRS Duration 92 ms    Q-T Interval 344 ms    QTc Calculation (Bazett) 411 ms    P Axis 72 degrees    R Axis 64 degrees    T Axis 63 degrees    Diagnosis Normal sinus rhythm    CBC with Auto Differential    Collection Time: 05/27/22  2:38 PM   Result Value Ref Range    WBC 4.9 4.3 - 11.1 K/uL    RBC 4.51 4.05 - 5.2 M/uL    Hemoglobin 14.7 11.7 - 15.4 g/dL    Hematocrit 42.9 35.8 - 46.3 %    MCV 95.1 79.6 - 97.8 FL    MCH 32.6 26.1 - 32.9 PG    MCHC 34.3 31.4 - 35.0 g/dL    RDW 11.5 (L) 11.9 - 14.6 %    Platelets 734 935 - 446 K/uL    MPV 9.6 9.4 - 12.3 FL    nRBC 0.00 0.0 - 0.2 K/uL    Differential Type AUTOMATED      Seg Neutrophils 66 43 - 78 %    Lymphocytes 21 13 - 44 %    Monocytes 12 4.0 - 12.0 %    Eosinophils % 0 (L) 0.5 - 7.8 %    Basophils 0 0.0 - 2.0 %    Immature Granulocytes 0 0.0 - 5.0 %    Segs Absolute 3.2 1.7 - 8.2 K/UL    Absolute Lymph # 1.0 0.5 - 4.6 K/UL    Absolute Mono # 0.6 0.1 - 1.3 K/UL    Absolute Eos # 0.0 0.0 - 0.8 K/UL    Basophils Absolute 0.0 0.0 - 0.2 K/UL    Absolute Immature Granulocyte 0.0 0.0 - 0.5 K/UL   Comprehensive Metabolic Panel    Collection Time: 05/27/22  2:38 PM   Result Value Ref Range    Sodium 139 136 - 145 mmol/L    Potassium 3.6 3.5 - 5.1 mmol/L    Chloride 102 98 - 107 mmol/L    CO2 30 21 - 32 mmol/L    Anion Gap 7 7 - 16 mmol/L    Glucose 80 65 - 100 mg/dL    BUN 9 8 - 23 MG/DL    CREATININE 0.88 0.6 - 1.0 MG/DL GFR African American >60 >60 ml/min/1.73m2    GFR Non- >60 >60 ml/min/1.73m2    Calcium 9.5 8.3 - 10.4 MG/DL    Total Bilirubin 0.5 0.2 - 1.1 MG/DL    ALT 32 12 - 65 U/L    AST 45 (H) 15 - 37 U/L    Alk Phosphatase 71 50 - 130 U/L    Total Protein 8.5 (H) 6.3 - 8.2 g/dL    Albumin 4.1 3.2 - 4.6 g/dL    Globulin 4.4 (H) 2.3 - 3.5 g/dL    Albumin/Globulin Ratio 0.9 (L) 1.2 - 3.5     D-Dimer, Quantitative    Collection Time: 05/27/22  4:43 PM   Result Value Ref Range    D-Dimer, Quant 0.81 (H) <0.56 ug/ml(FEU)   Lactic Acid    Collection Time: 05/27/22  7:31 PM   Result Value Ref Range    Lactic Acid, Plasma 0.7 0.4 - 2.0 MMOL/L         Other Studies:  XR CHEST (2 VW)    Result Date: 5/27/2022  History: shortness of breath Two views chest Findings: The lungs are well expanded and clear. The cardiac silhouette, and mediastinal contour, and osseous structures are normal.     Unremarkable two-view chest.     CT CHEST PULMONARY EMBOLISM W CONTRAST    Result Date: 5/27/2022  Exam: CT CHEST PULMONARY EMBOLISM W CONTRAST on 5/27/2022 6:44 PM Clinical History: The Female patient is 73years old  presenting for cough and lower rib pain. Technique: Thin section axial images were obtained through the chest with intravenous contrast.  Coronal reformatted images were also provided for review. A total of 100 ml of Iopamidol (ISOVUE-370) 76 % contrast was administered intravenously. All CT scans at this facility are performed using dose reduction/dose modulation techniques, as appropriate the performed exam, including the following: Automated Exposure Control; Adjustment of the mA and/or kV according to patient size (this includes techniques or standardized protocols for targeted exams where dose is matched to indication/reason for exam); and Use of Iterative Reconstruction Technique. Radiation Exposure Indices: Reference Air Kerma (Darin Promise) = 361 mGy-cm Comparison:  Chest CT 8/23/2013 and chest x-ray 5/27/2022. FINDINGS: Images through the lungs demonstrate infiltrative changes in the superior left lower lobe segment and to lesser degree left lung base Normal vascular enhancement is demonstrated. No evidence of aortic dissection or aneurysmal dilatation is seen. There is no evidence of pulmonary embolic disease. There is no significant hilar or mediastinal lymphadenopathy. Images chest wall structures as well as visualized portions of the upper abdomen are unremarkable in appearance. There are no acute osseous abnormalities. 1. No evidence of pulmonary embolic disease. 2. Left lower lobe infiltrative changes consistent with pneumonia. Follow-up to resolution is recommended. CPT code(s) H585236       No results found for this or any previous visit.  azithromycin  500 mg IntraVENous NOW           Signed:  Mariela Yeboah MD    Part of this note may have been written by using a voice dictation software. The note has been proof read but may still contain some grammatical/other typographical errors.

## 2022-05-28 NOTE — PROGRESS NOTES
Hospitalist Progress Note   Admit Date:  2022  3:17 PM   Name:  Pooja Lunsford   Age:  73 y.o. Sex:  female  :  1950   MRN:  [id number]   Room:  [facility]    Presenting Complaint: Shortness of Breath     Reason(s) for Admission: Acute hypoxemic respiratory failure ([facility] [address] [J96.01]  CAP (community acquired pneumonia) due to Chlamydia species [J16.0]  Community acquired pneumonia of left lower lobe of lung [J18.9]     Hospital Course & Interval History:   Please refer to the admission H&P for details of presentation. In summary, Pooja Lunsford is a 73 y.o. female with no significant medical history who presented due to cough with shortness of breath. Patient reports that she has been battling a cold for past several days. Having sick family members. COVID test has been negative. Desaturating down to 87% on room air in ED and admitted for respiratory failure with hypoxia lower lobe pneumonia. Subjective/24 hr Events (22) : Patient is seen and examined at bedside. No acute events reported overnight by nursing staff. Reports slight improvement in her breathing compared to prior to admission. Currently on 2 L of O2 nasal cannula without any respiratory distress. Complains of nasal discharge and postnasal drip. Having left-sided chest pain on deep breaths and cough. Patient denies fever, chills, chest pains, n/v, abdominal pain. Review of Systems: 10 point review of systems is otherwise negative with the exception of the elements mentioned above. Assessment & Plan:   Acute respiratory failure with hypoxia left lower lobe pneumonia  CT scan without any pulmonary embolism but showed left lower lobe infiltrates.   22: Respiratory status is not at baseline as patient has not required oxygen prior to arrival to the hospital  -on CAP treatment with Ceftriaxone and azithromycin  -hycoden  -nebs PRN  -mucinex, flonase     Neuropathy: on gabapentin 300mg TID  IBS: on bentyl    Diet:  ADULT DIET; Regular  DVT PPx: lovenox   Code status: Full Code       I have reviewed and ordered clinical lab tests and independently visualized images, tracing. I spent 30 minutes of time caring for this patient at bedside or nearby, more than 50 percent of which was spent on coordination of care and/or counseling regarding the disease process, treatment options, and treatment plan. Hospital Problems           Last Modified POA    * (Principal) Community acquired pneumonia of left lower lobe of lung 5/27/2022 Yes    Acute respiratory failure with hypoxia ([address] 5/28/2022 Yes    IBS (irritable bowel syndrome) 5/28/2022 Yes    Neuropathy 5/28/2022 Yes            Objective:     Patient Vitals for the past 24 hrs:   Temp Pulse Resp BP SpO2   05/28/22 1055 97.5 °F (36.4 °C) 68 18 131/76 97 %   05/28/22 0714 97.9 °F (36.6 °C) 56 18 106/62 96 %   05/28/22 0433 97.5 °F (36.4 °C) 57 18 (!) 111/58 96 %   05/27/22 2123 97.9 °F (36.6 °C) 94 18 130/71 95 %   05/27/22 2024 98.7 °F (37.1 °C) 94 20 134/74 94 %   05/27/22 1930 -- -- -- -- 93 %   05/27/22 1904 -- -- -- -- (!) 87 %   05/27/22 1850 -- -- -- -- (!) 88 %   05/27/22 1833 -- -- -- -- (!) 89 %   05/27/22 1759 98.8 °F (37.1 °C) 95 16 121/71 94 %   05/27/22 1621 -- 92 15 -- 94 %       Oxygen Therapy  SpO2: 97 %  Pulse Oximeter Device Mode: Intermittent  O2 Device: None (Room air)  O2 Flow Rate (L/min): 2 L/min    Estimated body mass index is 22.1 kg/m² as calculated from the following:    Height as of this encounter: 5' 6\" (1.676 m). Weight as of this encounter: 136 lb 14.4 oz (62.1 kg). Intake/Output Summary (Last 24 hours) at 5/28/2022 1425  Last data filed at 5/28/2022 0921  Gross per 24 hour   Intake --   Output 1200 ml   Net -1200 ml         Physical Exam:     Blood pressure 131/76, pulse 68, temperature 97.5 °F (36.4 °C), temperature source Oral, resp.  rate 18, height 5' 6\" (1.676 m), weight 136 lb 14.4 oz (62.1 kg), SpO2 97 %.  General:    Ill appearing. No overt distress  Head:  Normocephalic, atraumatic  Eyes:  Sclerae appear normal.  Pupils equally round. ENT:  Nares appear normal, no drainage. Moist oral mucosa  Neck:  No restricted ROM. Trachea midline   CV:   RRR. No jugular venous distension. Lungs:   Scattered rhonchi, diminished breath sound at the LLL, no wheezing. Abdomen: Bowel sounds present. Soft, nontender, nondistended. Extremities: No cyanosis or clubbing. No edema  Skin:     No rashes and normal coloration. Warm and dry. Neuro:  CN II-XII grossly intact. A&Ox3  Psych:  Normal mood and affect.       I have reviewed ordered lab tests and independently visualized imaging below:    Recent Labs:  Recent Results (from the past 48 hour(s))   EKG 12 Lead    Collection Time: 05/27/22  2:30 PM   Result Value Ref Range    Ventricular Rate 86 BPM    Atrial Rate 86 BPM    P-R Interval 192 ms    QRS Duration 92 ms    Q-T Interval 344 ms    QTc Calculation (Bazett) 411 ms    P Axis 72 degrees    R Axis 64 degrees    T Axis 63 degrees    Diagnosis Normal sinus rhythm    CBC with Auto Differential    Collection Time: 05/27/22  2:38 PM   Result Value Ref Range    WBC 4.9 4.3 - 11.1 K/uL    RBC 4.51 4.05 - 5.2 M/uL    Hemoglobin 14.7 11.7 - 15.4 g/dL    Hematocrit 42.9 35.8 - 46.3 %    MCV 95.1 79.6 - 97.8 FL    MCH 32.6 26.1 - 32.9 PG    MCHC 34.3 31.4 - 35.0 g/dL    RDW 11.5 (L) 11.9 - 14.6 %    Platelets 005 018 - 397 K/uL    MPV 9.6 9.4 - 12.3 FL    nRBC 0.00 0.0 - 0.2 K/uL    Differential Type AUTOMATED      Seg Neutrophils 66 43 - 78 %    Lymphocytes 21 13 - 44 %    Monocytes 12 4.0 - 12.0 %    Eosinophils % 0 (L) 0.5 - 7.8 %    Basophils 0 0.0 - 2.0 %    Immature Granulocytes 0 0.0 - 5.0 %    Segs Absolute 3.2 1.7 - 8.2 K/UL    Absolute Lymph # 1.0 0.5 - 4.6 K/UL    Absolute Mono # 0.6 0.1 - 1.3 K/UL    Absolute Eos # 0.0 0.0 - 0.8 K/UL    Basophils Absolute 0.0 0.0 - 0.2 K/UL    Absolute Immature Granulocyte 0.0 0.0 - 0.5 K/UL   Comprehensive Metabolic Panel    Collection Time: 05/27/22  2:38 PM   Result Value Ref Range    Sodium 139 136 - 145 mmol/L    Potassium 3.6 3.5 - 5.1 mmol/L    Chloride 102 98 - 107 mmol/L    CO2 30 21 - 32 mmol/L    Anion Gap 7 7 - 16 mmol/L    Glucose 80 65 - 100 mg/dL    BUN 9 8 - 23 MG/DL    CREATININE 0.88 0.6 - 1.0 MG/DL    GFR African American >60 >60 ml/min/1.73m2    GFR Non- >60 >60 ml/min/1.73m2    Calcium 9.5 8.3 - 10.4 MG/DL    Total Bilirubin 0.5 0.2 - 1.1 MG/DL    ALT 32 12 - 65 U/L    AST 45 (H) 15 - 37 U/L    Alk Phosphatase 71 50 - 130 U/L    Total Protein 8.5 (H) 6.3 - 8.2 g/dL    Albumin 4.1 3.2 - 4.6 g/dL    Globulin 4.4 (H) 2.3 - 3.5 g/dL    Albumin/Globulin Ratio 0.9 (L) 1.2 - 3.5     Procalcitonin    Collection Time: 05/27/22  2:38 PM   Result Value Ref Range    Procalcitonin <0.05 0.00 - 0.49 ng/mL   D-Dimer, Quantitative    Collection Time: 05/27/22  4:43 PM   Result Value Ref Range    D-Dimer, Quant 0.81 (H) <0.56 ug/ml(FEU)   Culture, Blood 1    Collection Time: 05/27/22  7:30 PM    Specimen: Blood   Result Value Ref Range    Special Requests LEFT  HAND        Culture NO GROWTH AFTER 8 HOURS     Culture, Blood 1    Collection Time: 05/27/22  7:30 PM    Specimen: Blood   Result Value Ref Range    Special Requests RIGHT  Antecubital        Culture NO GROWTH AFTER 8 HOURS     Lactic Acid    Collection Time: 05/27/22  7:31 PM   Result Value Ref Range    Lactic Acid, Plasma 0.7 0.4 - 2.0 MMOL/L   Basic Metabolic Panel w/ Reflex to MG    Collection Time: 05/28/22  5:26 AM   Result Value Ref Range    Sodium 139 136 - 145 mmol/L    Potassium 4.1 3.5 - 5.1 mmol/L    Chloride 106 98 - 107 mmol/L    CO2 28 21 - 32 mmol/L    Anion Gap 5 (L) 7 - 16 mmol/L    Glucose 130 (H) 65 - 100 mg/dL    BUN 9 8 - 23 MG/DL    CREATININE 0.74 0.6 - 1.0 MG/DL    GFR African American >60 >60 ml/min/1.73m2    GFR Non- >60 >60 ml/min/1.73m2    Calcium 9.0 8.3 - 10.4 MG/DL   CBC with Auto Differential    Collection Time: 05/28/22  5:26 AM   Result Value Ref Range    WBC 4.4 4.3 - 11.1 K/uL    RBC 3.70 (L) 4.05 - 5.2 M/uL    Hemoglobin 12.0 11.7 - 15.4 g/dL    Hematocrit 34.7 (L) 35.8 - 46.3 %    MCV 93.8 79.6 - 97.8 FL    MCH 32.4 26.1 - 32.9 PG    MCHC 34.6 31.4 - 35.0 g/dL    RDW 11.3 (L) 11.9 - 14.6 %    Platelets 589 436 - 660 K/uL    MPV 9.8 9.4 - 12.3 FL    nRBC 0.00 0.0 - 0.2 K/uL    Differential Type AUTOMATED      Seg Neutrophils 77 43 - 78 %    Lymphocytes 18 13 - 44 %    Monocytes 4 4.0 - 12.0 %    Eosinophils % 0 (L) 0.5 - 7.8 %    Basophils 0 0.0 - 2.0 %    Immature Granulocytes 0 0.0 - 5.0 %    Segs Absolute 3.4 1.7 - 8.2 K/UL    Absolute Lymph # 0.8 0.5 - 4.6 K/UL    Absolute Mono # 0.2 0.1 - 1.3 K/UL    Absolute Eos # 0.0 0.0 - 0.8 K/UL    Basophils Absolute 0.0 0.0 - 0.2 K/UL    Absolute Immature Granulocyte 0.0 0.0 - 0.5 K/UL         Other Studies:  XR CHEST (2 VW)    Result Date: 5/27/2022  History: shortness of breath Two views chest Findings: The lungs are well expanded and clear. The cardiac silhouette, and mediastinal contour, and osseous structures are normal.     Unremarkable two-view chest.     CT CHEST PULMONARY EMBOLISM W CONTRAST    Result Date: 5/27/2022  Exam: CT CHEST PULMONARY EMBOLISM W CONTRAST on 5/27/2022 6:44 PM Clinical History: The Female patient is 73years old  presenting for cough and lower rib pain. Technique: Thin section axial images were obtained through the chest with intravenous contrast.  Coronal reformatted images were also provided for review. A total of 100 ml of Iopamidol (ISOVUE-370) 76 % contrast was administered intravenously. All CT scans at this facility are performed using dose reduction/dose modulation techniques, as appropriate the performed exam, including the following: Automated Exposure Control;  Adjustment of the mA and/or kV according to patient size (this includes techniques or standardized protocols for targeted exams where dose is matched to indication/reason for exam); and Use of Iterative Reconstruction Technique. Radiation Exposure Indices: Reference Air Kerma (Lakia Segundo) = 361 mGy-cm Comparison:  Chest CT 8/23/2013 and chest x-ray 5/27/2022. FINDINGS: Images through the lungs demonstrate infiltrative changes in the superior left lower lobe segment and to lesser degree left lung base Normal vascular enhancement is demonstrated. No evidence of aortic dissection or aneurysmal dilatation is seen. There is no evidence of pulmonary embolic disease. There is no significant hilar or mediastinal lymphadenopathy. Images chest wall structures as well as visualized portions of the upper abdomen are unremarkable in appearance. There are no acute osseous abnormalities. 1. No evidence of pulmonary embolic disease. 2. Left lower lobe infiltrative changes consistent with pneumonia. Follow-up to resolution is recommended.  CPT code(s) [id number]       Current Meds:  Current Facility-Administered Medications   Medication Dose Route Frequency    lidocaine 4 % external patch 1 patch  1 patch TransDERmal Daily    fluticasone (FLONASE) 50 MCG/ACT nasal spray 1 spray  1 spray Each Nostril Daily    cholestyramine light packet 4 g  4 g Oral Daily    zolpidem (AMBIEN) tablet 5 mg  5 mg Oral Nightly PRN    gabapentin (NEURONTIN) capsule 300 mg  300 mg Oral TID    sodium chloride flush 0.9 % injection 5-40 mL  5-40 mL IntraVENous 2 times per day    sodium chloride flush 0.9 % injection 5-40 mL  5-40 mL IntraVENous PRN    0.9 % sodium chloride infusion   IntraVENous PRN    enoxaparin (LOVENOX) injection 40 mg  40 mg SubCUTAneous Daily    ondansetron (ZOFRAN-ODT) disintegrating tablet 4 mg  4 mg Oral Q8H PRN    Or    ondansetron (ZOFRAN) injection 4 mg  4 mg IntraVENous Q6H PRN    polyethylene glycol (GLYCOLAX) packet 17 g  17 g Oral Daily PRN    famotidine (PEPCID) tablet 20 mg  20 mg Oral BID    acetaminophen (TYLENOL) tablet 650 mg  650 mg Oral Q6H PRN    Or    acetaminophen (TYLENOL) suppository 650 mg  650 mg Rectal Q6H PRN    potassium chloride (KLOR-CON M) extended release tablet 40 mEq  40 mEq Oral PRN    Or    potassium bicarb-citric acid (EFFER-K) effervescent tablet 40 mEq  40 mEq Oral PRN    Or    potassium chloride 10 mEq/100 mL IVPB (Peripheral Line)  10 mEq IntraVENous PRN    magnesium sulfate 2000 mg in 50 mL IVPB premix  2,000 mg IntraVENous PRN    cefTRIAXone (ROCEPHIN) 1000 mg IVPB in NS 50ml minibag  1,000 mg IntraVENous Q24H    azithromycin (ZITHROMAX) tablet 250 mg  250 mg Oral Daily    HYDROcodone homatropine (HYCODAN) 5-1.5 MG/5ML solution 5 mL  5 mL Oral Q4H PRN    guaiFENesin (MUCINEX) extended release tablet 600 mg  600 mg Oral BID    oxyCODONE (ROXICODONE) immediate release tablet 5 mg  5 mg Oral Q4H PRN    morphine injection 4 mg  4 mg IntraVENous Q4H PRN    prochlorperazine (COMPAZINE) tablet 10 mg  10 mg Oral Q6H PRN     Facility-Administered Medications Ordered in Other Encounters   Medication Dose Route Frequency    sodium chloride flush 0.9 % injection 5-40 mL  5-40 mL IntraVENous BID    0.9 % sodium chloride bolus  100 mL IntraVENous Once       Signed:  Carmen Burkitt, MD    Part of this note may have been written by using a voice dictation software. The note has been proof read but may still contain some grammatical/other typographical errors.

## 2022-05-28 NOTE — ACP (ADVANCE CARE PLANNING)
Advanced Care Planning (Initial Encounter)      Name: Praveen Le            Age: 73 y.o. Sex: female  : 1950    MRN:     [id number]    Date of ACP Conversation: 22    Conversation Conducted with: Patient with Decision Making Capacity    Patient is AAOx3 and has adequate capacity to make medical decisions. In the event that she is no longer able to make medical decision, she would like her  to make medical decisions. Discussed the current conditions, workup/management plans as well as prognosis. The patient has been informed and is fully aware of the sufficient risks of the active diagnosis and risk for further deterioration due to the underlying condition. In the event of cardiopulmonary arrest,  patient would like us to perform resuscitation including chest compression and intubation.     Time Spent face to face with patient/family:  16 mins (This is addition to time spent for clinical care)        Code Status: Full Code  [facility] Care Decision Maker: Samantha Horta ([phone])    Tyrell Urbina MD

## 2022-05-28 NOTE — PROGRESS NOTES
TRANSFER - IN REPORT:    Verbal report received from Kamaljit Lr RN on Melina Gomez  being received from [address] for routine progression of patient care      Report consisted of patient's Situation, Background, Assessment and   Recommendations(SBAR). Information from the following report(s) ED SBAR, [facility] and Recent Results was reviewed with the receiving nurse. Opportunity for questions and clarification was provided. Assessment to be completed upon patient's arrival to unit and care to be assumed.

## 2022-05-28 NOTE — CARE COORDINATION
73 yr-old F with CAP. Lives at home with spouse. Has PCP. Spoke with pt - she said her spouse was currently at Urgent Care with the same thing she has. Pt. plans to return home at discharge. No needs at present. SW will follow for any needs.        05/28/22 1506   Service Assessment   Patient Orientation Alert and Oriented   Support Systems Spouse/Significant Other   Prior Functional Level Independent in ADLs/IADLs   Can patient return to prior living arrangement Yes

## 2022-05-29 LAB
ANION GAP SERPL CALC-SCNC: 7 MMOL/L (ref 7–16)
BUN SERPL-MCNC: 13 MG/DL (ref 8–23)
CALCIUM SERPL-MCNC: 8.6 MG/DL (ref 8.3–10.4)
CHLORIDE SERPL-SCNC: 106 MMOL/L (ref 98–107)
CO2 SERPL-SCNC: 28 MMOL/L (ref 21–32)
CREAT SERPL-MCNC: 0.78 MG/DL (ref 0.6–1)
ERYTHROCYTE [DISTWIDTH] IN BLOOD BY AUTOMATED COUNT: 11.6 % (ref 11.9–14.6)
GLUCOSE SERPL-MCNC: 89 MG/DL (ref 65–100)
HCT VFR BLD AUTO: 34.1 % (ref 35.8–46.3)
HGB BLD-MCNC: 11.9 G/DL (ref 11.7–15.4)
MAGNESIUM SERPL-MCNC: 2.5 MG/DL (ref 1.8–2.4)
MCH RBC QN AUTO: 32.9 PG (ref 26.1–32.9)
MCHC RBC AUTO-ENTMCNC: 34.9 G/DL (ref 31.4–35)
MCV RBC AUTO: 94.2 FL (ref 79.6–97.8)
NRBC # BLD: 0 K/UL (ref 0–0.2)
PLATELET # BLD AUTO: 231 K/UL (ref 150–450)
PMV BLD AUTO: 9.4 FL (ref 9.4–12.3)
POTASSIUM SERPL-SCNC: 4.1 MMOL/L (ref 3.5–5.1)
RBC # BLD AUTO: 3.62 M/UL (ref 4.05–5.2)
SODIUM SERPL-SCNC: 141 MMOL/L (ref 136–145)
WBC # BLD AUTO: 6.8 K/UL (ref 4.3–11.1)

## 2022-05-29 PROCEDURE — 6360000002 HC RX W HCPCS: Performed by: FAMILY MEDICINE

## 2022-05-29 PROCEDURE — 94760 N-INVAS EAR/PLS OXIMETRY 1: CPT

## 2022-05-29 PROCEDURE — 85027 COMPLETE CBC AUTOMATED: CPT

## 2022-05-29 PROCEDURE — 36415 COLL VENOUS BLD VENIPUNCTURE: CPT

## 2022-05-29 PROCEDURE — 80048 BASIC METABOLIC PNL TOTAL CA: CPT

## 2022-05-29 PROCEDURE — 6370000000 HC RX 637 (ALT 250 FOR IP): Performed by: INTERNAL MEDICINE

## 2022-05-29 PROCEDURE — 83735 ASSAY OF MAGNESIUM: CPT

## 2022-05-29 PROCEDURE — 6370000000 HC RX 637 (ALT 250 FOR IP): Performed by: FAMILY MEDICINE

## 2022-05-29 PROCEDURE — 2580000003 HC RX 258: Performed by: FAMILY MEDICINE

## 2022-05-29 PROCEDURE — 1100000000 HC RM PRIVATE

## 2022-05-29 RX ORDER — GABAPENTIN 400 MG/1
400 CAPSULE ORAL 3 TIMES DAILY
Status: DISCONTINUED | OUTPATIENT
Start: 2022-05-29 | End: 2022-05-29

## 2022-05-29 RX ORDER — DOCUSATE SODIUM 100 MG/1
100 CAPSULE, LIQUID FILLED ORAL DAILY
Status: DISCONTINUED | OUTPATIENT
Start: 2022-05-29 | End: 2022-05-30 | Stop reason: HOSPADM

## 2022-05-29 RX ORDER — MONTELUKAST SODIUM 4 MG/1
1 TABLET, CHEWABLE ORAL
Status: DISCONTINUED | OUTPATIENT
Start: 2022-05-30 | End: 2022-05-30 | Stop reason: HOSPADM

## 2022-05-29 RX ORDER — LANOLIN ALCOHOL/MO/W.PET/CERES
3 CREAM (GRAM) TOPICAL NIGHTLY PRN
Status: DISCONTINUED | OUTPATIENT
Start: 2022-05-29 | End: 2022-05-30 | Stop reason: HOSPADM

## 2022-05-29 RX ORDER — OXYCODONE HYDROCHLORIDE 5 MG/1
5 TABLET ORAL EVERY 4 HOURS PRN
Status: DISCONTINUED | OUTPATIENT
Start: 2022-05-29 | End: 2022-05-30 | Stop reason: HOSPADM

## 2022-05-29 RX ORDER — ESZOPICLONE 1 MG/1
2 TABLET, FILM COATED ORAL NIGHTLY PRN
Status: DISCONTINUED | OUTPATIENT
Start: 2022-05-29 | End: 2022-05-29 | Stop reason: SDUPTHER

## 2022-05-29 RX ORDER — GABAPENTIN 300 MG/1
300 CAPSULE ORAL 3 TIMES DAILY
Status: DISCONTINUED | OUTPATIENT
Start: 2022-05-29 | End: 2022-05-30 | Stop reason: HOSPADM

## 2022-05-29 RX ORDER — LANOLIN ALCOHOL/MO/W.PET/CERES
3 CREAM (GRAM) TOPICAL NIGHTLY PRN
Status: DISCONTINUED | OUTPATIENT
Start: 2022-05-29 | End: 2022-05-29

## 2022-05-29 RX ORDER — ESZOPICLONE 1 MG/1
2 TABLET, FILM COATED ORAL NIGHTLY
Status: DISCONTINUED | OUTPATIENT
Start: 2022-05-29 | End: 2022-05-30 | Stop reason: HOSPADM

## 2022-05-29 RX ORDER — OXYCODONE HYDROCHLORIDE 5 MG/1
10 TABLET ORAL EVERY 4 HOURS PRN
Status: DISCONTINUED | OUTPATIENT
Start: 2022-05-29 | End: 2022-05-29

## 2022-05-29 RX ADMIN — SODIUM CHLORIDE, PRESERVATIVE FREE 10 ML: 5 INJECTION INTRAVENOUS at 08:14

## 2022-05-29 RX ADMIN — FLUTICASONE PROPIONATE 1 SPRAY: 50 SPRAY, METERED NASAL at 08:17

## 2022-05-29 RX ADMIN — CHOLESTYRAMINE 4 G: 4 POWDER, FOR SUSPENSION ORAL at 08:13

## 2022-05-29 RX ADMIN — ESZOPICLONE 2 MG: 1 TABLET, FILM COATED ORAL at 22:39

## 2022-05-29 RX ADMIN — FAMOTIDINE 20 MG: 20 TABLET, FILM COATED ORAL at 08:12

## 2022-05-29 RX ADMIN — ENOXAPARIN SODIUM 40 MG: 100 INJECTION SUBCUTANEOUS at 08:15

## 2022-05-29 RX ADMIN — CEFTRIAXONE 1000 MG: 1 INJECTION, POWDER, FOR SOLUTION INTRAMUSCULAR; INTRAVENOUS at 20:04

## 2022-05-29 RX ADMIN — Medication 3 MG: at 02:56

## 2022-05-29 RX ADMIN — GABAPENTIN 300 MG: 300 CAPSULE ORAL at 13:50

## 2022-05-29 RX ADMIN — GABAPENTIN 300 MG: 300 CAPSULE ORAL at 21:44

## 2022-05-29 RX ADMIN — AZITHROMYCIN MONOHYDRATE 250 MG: 250 TABLET ORAL at 08:12

## 2022-05-29 RX ADMIN — GABAPENTIN 300 MG: 300 CAPSULE ORAL at 08:12

## 2022-05-29 RX ADMIN — SODIUM CHLORIDE, PRESERVATIVE FREE 10 ML: 5 INJECTION INTRAVENOUS at 22:50

## 2022-05-29 RX ADMIN — GUAIFENESIN 600 MG: 600 TABLET, EXTENDED RELEASE ORAL at 21:47

## 2022-05-29 RX ADMIN — ACETAMINOPHEN 650 MG: 325 TABLET, FILM COATED ORAL at 15:15

## 2022-05-29 RX ADMIN — FAMOTIDINE 20 MG: 20 TABLET, FILM COATED ORAL at 21:47

## 2022-05-29 RX ADMIN — DOCUSATE SODIUM 100 MG: 100 CAPSULE ORAL at 15:15

## 2022-05-29 RX ADMIN — GUAIFENESIN 600 MG: 600 TABLET, EXTENDED RELEASE ORAL at 08:12

## 2022-05-29 ASSESSMENT — PAIN DESCRIPTION - LOCATION: LOCATION: HEAD

## 2022-05-29 ASSESSMENT — PAIN DESCRIPTION - ORIENTATION: ORIENTATION: OTHER (COMMENT)

## 2022-05-29 ASSESSMENT — PAIN SCALES - GENERAL
PAINLEVEL_OUTOF10: 3
PAINLEVEL_OUTOF10: 0
PAINLEVEL_OUTOF10: 0

## 2022-05-29 ASSESSMENT — PAIN - FUNCTIONAL ASSESSMENT: PAIN_FUNCTIONAL_ASSESSMENT: PREVENTS OR INTERFERES SOME ACTIVE ACTIVITIES AND ADLS

## 2022-05-29 ASSESSMENT — PAIN DESCRIPTION - DESCRIPTORS: DESCRIPTORS: ACHING

## 2022-05-29 NOTE — PROGRESS NOTES
Resting quietly, resp even, unlab with O2, arousing easily during the night at intervals. Received paiin meds and sleep med. Continues to void without difficulty. Stance and gait steady. No distress noted. Report given to Pio Ca RN.

## 2022-05-29 NOTE — PLAN OF CARE
Problem: Respiratory - Adult  Goal: Achieves optimal ventilation and oxygenation  5/28/2022 2018 by Alpha Boeck, RCP  Outcome: Progressing  5/28/2022 2017 by Alpha Boeck, RCP  Flowsheets (Taken 5/28/2022 2017)  Achieves optimal ventilation and oxygenation:   Assess for changes in respiratory status   Position to facilitate oxygenation and minimize respiratory effort   Respiratory therapy support as indicated   Assess for changes in mentation and behavior   Oxygen supplementation based on oxygen saturation or arterial blood gases   Encourage broncho-pulmonary hygiene including cough, deep breathe, incentive spirometry   Assess and instruct to report shortness of breath or any respiratory difficulty

## 2022-05-29 NOTE — CARE COORDINATION
Patient discussed in rounds with MD, anticipate home tomorrow following a 3 step qualifier.      Erich Severin, LMSW    [facility]

## 2022-05-29 NOTE — PROGRESS NOTES
ADULT DIET; Regular  DVT PPx: lovenox   Code status: Full Code       I have reviewed and ordered clinical lab tests and independently visualized images, tracing. Hospital Problems           Last Modified POA    * (Principal) Community acquired pneumonia of left lower lobe of lung 5/27/2022 Yes    Acute respiratory failure with hypoxia ([address] 5/28/2022 Yes    IBS (irritable bowel syndrome) 5/28/2022 Yes    Neuropathy 5/28/2022 Yes            Objective:     Patient Vitals for the past 24 hrs:   Temp Pulse Resp BP SpO2   05/29/22 1122 98.1 °F (36.7 °C) 60 17 113/60 96 %   05/29/22 0755 97.9 °F (36.6 °C) 58 17 (!) 105/53 96 %   05/29/22 0253 97.8 °F (36.6 °C) 56 18 118/60 98 %   05/29/22 0024 97.5 °F (36.4 °C) 55 18 133/72 97 %   05/28/22 2016 -- 62 18 -- 96 %   05/28/22 2012 -- 62 18 -- 96 %   05/28/22 2006 97.7 °F (36.5 °C) 65 17 (!) 142/71 98 %   05/28/22 1636 -- 63 14 -- 94 %   05/28/22 1534 97.9 °F (36.6 °C) 71 18 (!) 108/55 98 %       Oxygen Therapy  SpO2: 96 %  Pulse Oximeter Device Mode: Intermittent  O2 Device: Nasal cannula  O2 Flow Rate (L/min): 2 L/min    Estimated body mass index is 22.1 kg/m² as calculated from the following:    Height as of this encounter: 5' 6\" (1.676 m). Weight as of this encounter: 136 lb 14.4 oz (62.1 kg). Intake/Output Summary (Last 24 hours) at 5/29/2022 1313  Last data filed at 5/29/2022 0615  Gross per 24 hour   Intake --   Output 1350 ml   Net -1350 ml         Physical Exam:     Blood pressure 113/60, pulse 60, temperature 98.1 °F (36.7 °C), temperature source Oral, resp. rate 17, height 5' 6\" (1.676 m), weight 136 lb 14.4 oz (62.1 kg), SpO2 96 %. General:    Ill appearing. No overt distress  Head:  Normocephalic, atraumatic  Eyes:  Sclerae appear normal.  Pupils equally round. ENT:  Nares appear normal, no drainage. Moist oral mucosa  Neck:  No restricted ROM. Trachea midline   CV:   RRR. No jugular venous distension.   Lungs:   Scattered rhonchi, diminished breath sound at the LLL, no wheezing. Abdomen: Bowel sounds present. Soft, nontender, nondistended. Extremities: No cyanosis or clubbing. No edema  Skin:     No rashes and normal coloration. Warm and dry. Neuro:  CN II-XII grossly intact. A&Ox3  Psych:  Normal mood and affect.       I have reviewed ordered lab tests and independently visualized imaging below:    Recent Labs:  Recent Results (from the past 48 hour(s))   EKG 12 Lead    Collection Time: 05/27/22  2:30 PM   Result Value Ref Range    Ventricular Rate 86 BPM    Atrial Rate 86 BPM    P-R Interval 192 ms    QRS Duration 92 ms    Q-T Interval 344 ms    QTc Calculation (Bazett) 411 ms    P Axis 72 degrees    R Axis 64 degrees    T Axis 63 degrees    Diagnosis Normal sinus rhythm    CBC with Auto Differential    Collection Time: 05/27/22  2:38 PM   Result Value Ref Range    WBC 4.9 4.3 - 11.1 K/uL    RBC 4.51 4.05 - 5.2 M/uL    Hemoglobin 14.7 11.7 - 15.4 g/dL    Hematocrit 42.9 35.8 - 46.3 %    MCV 95.1 79.6 - 97.8 FL    MCH 32.6 26.1 - 32.9 PG    MCHC 34.3 31.4 - 35.0 g/dL    RDW 11.5 (L) 11.9 - 14.6 %    Platelets 159 073 - 281 K/uL    MPV 9.6 9.4 - 12.3 FL    nRBC 0.00 0.0 - 0.2 K/uL    Differential Type AUTOMATED      Seg Neutrophils 66 43 - 78 %    Lymphocytes 21 13 - 44 %    Monocytes 12 4.0 - 12.0 %    Eosinophils % 0 (L) 0.5 - 7.8 %    Basophils 0 0.0 - 2.0 %    Immature Granulocytes 0 0.0 - 5.0 %    Segs Absolute 3.2 1.7 - 8.2 K/UL    Absolute Lymph # 1.0 0.5 - 4.6 K/UL    Absolute Mono # 0.6 0.1 - 1.3 K/UL    Absolute Eos # 0.0 0.0 - 0.8 K/UL    Basophils Absolute 0.0 0.0 - 0.2 K/UL    Absolute Immature Granulocyte 0.0 0.0 - 0.5 K/UL   Comprehensive Metabolic Panel    Collection Time: 05/27/22  2:38 PM   Result Value Ref Range    Sodium 139 136 - 145 mmol/L    Potassium 3.6 3.5 - 5.1 mmol/L    Chloride 102 98 - 107 mmol/L    CO2 30 21 - 32 mmol/L    Anion Gap 7 7 - 16 mmol/L    Glucose 80 65 - 100 mg/dL    BUN 9 8 - 23 MG/DL CREATININE 0.88 0.6 - 1.0 MG/DL    GFR African American >60 >60 ml/min/1.73m2    GFR Non- >60 >60 ml/min/1.73m2    Calcium 9.5 8.3 - 10.4 MG/DL    Total Bilirubin 0.5 0.2 - 1.1 MG/DL    ALT 32 12 - 65 U/L    AST 45 (H) 15 - 37 U/L    Alk Phosphatase 71 50 - 130 U/L    Total Protein 8.5 (H) 6.3 - 8.2 g/dL    Albumin 4.1 3.2 - 4.6 g/dL    Globulin 4.4 (H) 2.3 - 3.5 g/dL    Albumin/Globulin Ratio 0.9 (L) 1.2 - 3.5     Procalcitonin    Collection Time: 05/27/22  2:38 PM   Result Value Ref Range    Procalcitonin <0.05 0.00 - 0.49 ng/mL   D-Dimer, Quantitative    Collection Time: 05/27/22  4:43 PM   Result Value Ref Range    D-Dimer, Quant 0.81 (H) <0.56 ug/ml(FEU)   Culture, Blood 1    Collection Time: 05/27/22  7:30 PM    Specimen: Blood   Result Value Ref Range    Special Requests LEFT  HAND        Culture NO GROWTH 2 DAYS     Culture, Blood 1    Collection Time: 05/27/22  7:30 PM    Specimen: Blood   Result Value Ref Range    Special Requests RIGHT  Antecubital        Culture NO GROWTH 2 DAYS     Lactic Acid    Collection Time: 05/27/22  7:31 PM   Result Value Ref Range    Lactic Acid, Plasma 0.7 0.4 - 2.0 MMOL/L   Basic Metabolic Panel w/ Reflex to MG    Collection Time: 05/28/22  5:26 AM   Result Value Ref Range    Sodium 139 136 - 145 mmol/L    Potassium 4.1 3.5 - 5.1 mmol/L    Chloride 106 98 - 107 mmol/L    CO2 28 21 - 32 mmol/L    Anion Gap 5 (L) 7 - 16 mmol/L    Glucose 130 (H) 65 - 100 mg/dL    BUN 9 8 - 23 MG/DL    CREATININE 0.74 0.6 - 1.0 MG/DL    GFR African American >60 >60 ml/min/1.73m2    GFR Non- >60 >60 ml/min/1.73m2    Calcium 9.0 8.3 - 10.4 MG/DL   CBC with Auto Differential    Collection Time: 05/28/22  5:26 AM   Result Value Ref Range    WBC 4.4 4.3 - 11.1 K/uL    RBC 3.70 (L) 4.05 - 5.2 M/uL    Hemoglobin 12.0 11.7 - 15.4 g/dL    Hematocrit 34.7 (L) 35.8 - 46.3 %    MCV 93.8 79.6 - 97.8 FL    MCH 32.4 26.1 - 32.9 PG    MCHC 34.6 31.4 - 35.0 g/dL    RDW 11.3 (L) Clinical History: The Female patient is 73years old  presenting for cough and lower rib pain. Technique: Thin section axial images were obtained through the chest with intravenous contrast.  Coronal reformatted images were also provided for review. A total of 100 ml of Iopamidol (ISOVUE-370) 76 % contrast was administered intravenously. All CT scans at this facility are performed using dose reduction/dose modulation techniques, as appropriate the performed exam, including the following: Automated Exposure Control; Adjustment of the mA and/or kV according to patient size (this includes techniques or standardized protocols for targeted exams where dose is matched to indication/reason for exam); and Use of Iterative Reconstruction Technique. Radiation Exposure Indices: Reference Air Kerma ([facility]) = 361 mGy-cm Comparison:  Chest CT 8/23/2013 and chest x-ray 5/27/2022. FINDINGS: Images through the lungs demonstrate infiltrative changes in the superior left lower lobe segment and to lesser degree left lung base Normal vascular enhancement is demonstrated. No evidence of aortic dissection or aneurysmal dilatation is seen. There is no evidence of pulmonary embolic disease. There is no significant hilar or mediastinal lymphadenopathy. Images chest wall structures as well as visualized portions of the upper abdomen are unremarkable in appearance. There are no acute osseous abnormalities. 1. No evidence of pulmonary embolic disease. 2. Left lower lobe infiltrative changes consistent with pneumonia. Follow-up to resolution is recommended.  CPT code(s) D1005025       Current Meds:  Current Facility-Administered Medications   Medication Dose Route Frequency    melatonin tablet 3 mg  3 mg Oral Nightly PRN    gabapentin (NEURONTIN) capsule 300 mg  300 mg Oral TID    oxyCODONE (ROXICODONE) immediate release tablet 5 mg  5 mg Oral Q4H PRN    lidocaine 4 % external patch 1 patch  1 patch TransDERmal Daily    fluticasone (FLONASE) 50 MCG/ACT nasal spray 1 spray  1 spray Each Nostril Daily    cholestyramine light packet 4 g  4 g Oral Daily    zolpidem (AMBIEN) tablet 5 mg  5 mg Oral Nightly PRN    sodium chloride flush 0.9 % injection 5-40 mL  5-40 mL IntraVENous 2 times per day    sodium chloride flush 0.9 % injection 5-40 mL  5-40 mL IntraVENous PRN    0.9 % sodium chloride infusion   IntraVENous PRN    enoxaparin (LOVENOX) injection 40 mg  40 mg SubCUTAneous Daily    ondansetron (ZOFRAN-ODT) disintegrating tablet 4 mg  4 mg Oral Q8H PRN    Or    ondansetron (ZOFRAN) injection 4 mg  4 mg IntraVENous Q6H PRN    polyethylene glycol (GLYCOLAX) packet 17 g  17 g Oral Daily PRN    famotidine (PEPCID) tablet 20 mg  20 mg Oral BID    acetaminophen (TYLENOL) tablet 650 mg  650 mg Oral Q6H PRN    Or    acetaminophen (TYLENOL) suppository 650 mg  650 mg Rectal Q6H PRN    potassium chloride (KLOR-CON M) extended release tablet 40 mEq  40 mEq Oral PRN    Or    potassium bicarb-citric acid (EFFER-K) effervescent tablet 40 mEq  40 mEq Oral PRN    Or    potassium chloride 10 mEq/100 mL IVPB (Peripheral Line)  10 mEq IntraVENous PRN    magnesium sulfate 2000 mg in 50 mL IVPB premix  2,000 mg IntraVENous PRN    cefTRIAXone (ROCEPHIN) 1000 mg IVPB in NS 50ml minibag  1,000 mg IntraVENous Q24H    azithromycin (ZITHROMAX) tablet 250 mg  250 mg Oral Daily    HYDROcodone homatropine (HYCODAN) 5-1.5 MG/5ML solution 5 mL  5 mL Oral Q4H PRN    guaiFENesin (MUCINEX) extended release tablet 600 mg  600 mg Oral BID    morphine injection 4 mg  4 mg IntraVENous Q4H PRN    prochlorperazine (COMPAZINE) tablet 10 mg  10 mg Oral Q6H PRN     Facility-Administered Medications Ordered in Other Encounters   Medication Dose Route Frequency    sodium chloride flush 0.9 % injection 5-40 mL  5-40 mL IntraVENous BID    0.9 % sodium chloride bolus  100 mL IntraVENous Once       Signed:  Maurizio Ash MD    Part of this note may have been written by using a voice dictation software. The note has been proof read but may still contain some grammatical/other typographical errors.

## 2022-05-29 NOTE — CARE COORDINATION
05/29/22 1502   IMM Letter   IMM Letter given to Patient/Family/Significant other/Guardian/POA/by: Purnima Avila LMSW   IMM Letter date given: 05/29/22   IMM Letter time given: 0     IM Letters Important Letter from Medicare provided to the patient/representative. Oral explanation was provided, and all questions answered. Signed document placed in the medical record. Copy to patient/representative.     Melo Helton LMSW    [address] [place] [address]

## 2022-05-30 VITALS
HEART RATE: 84 BPM | HEIGHT: 66 IN | BODY MASS INDEX: 22.5 KG/M2 | WEIGHT: 140 LBS | DIASTOLIC BLOOD PRESSURE: 69 MMHG | OXYGEN SATURATION: 94 % | TEMPERATURE: 97.9 F | SYSTOLIC BLOOD PRESSURE: 125 MMHG | RESPIRATION RATE: 17 BRPM

## 2022-05-30 PROBLEM — R07.81 PLEURITIC CHEST PAIN: Status: ACTIVE | Noted: 2022-05-30

## 2022-05-30 PROBLEM — J96.01 ACUTE RESPIRATORY FAILURE WITH HYPOXIA (HCC): Status: RESOLVED | Noted: 2022-05-28 | Resolved: 2022-05-30

## 2022-05-30 LAB
ANION GAP SERPL CALC-SCNC: 5 MMOL/L (ref 7–16)
BUN SERPL-MCNC: 16 MG/DL (ref 8–23)
CALCIUM SERPL-MCNC: 9 MG/DL (ref 8.3–10.4)
CHLORIDE SERPL-SCNC: 106 MMOL/L (ref 98–107)
CO2 SERPL-SCNC: 29 MMOL/L (ref 21–32)
CREAT SERPL-MCNC: 0.73 MG/DL (ref 0.6–1)
ERYTHROCYTE [DISTWIDTH] IN BLOOD BY AUTOMATED COUNT: 11.4 % (ref 11.9–14.6)
GLUCOSE SERPL-MCNC: 84 MG/DL (ref 65–100)
HCT VFR BLD AUTO: 35.2 % (ref 35.8–46.3)
HGB BLD-MCNC: 12.2 G/DL (ref 11.7–15.4)
MAGNESIUM SERPL-MCNC: 2.7 MG/DL (ref 1.8–2.4)
MCH RBC QN AUTO: 32.5 PG (ref 26.1–32.9)
MCHC RBC AUTO-ENTMCNC: 34.7 G/DL (ref 31.4–35)
MCV RBC AUTO: 93.9 FL (ref 79.6–97.8)
NRBC # BLD: 0 K/UL (ref 0–0.2)
PLATELET # BLD AUTO: 262 K/UL (ref 150–450)
PMV BLD AUTO: 9.4 FL (ref 9.4–12.3)
POTASSIUM SERPL-SCNC: 3.9 MMOL/L (ref 3.5–5.1)
RBC # BLD AUTO: 3.75 M/UL (ref 4.05–5.2)
SODIUM SERPL-SCNC: 140 MMOL/L (ref 136–145)
WBC # BLD AUTO: 4.7 K/UL (ref 4.3–11.1)

## 2022-05-30 PROCEDURE — 80048 BASIC METABOLIC PNL TOTAL CA: CPT

## 2022-05-30 PROCEDURE — 36415 COLL VENOUS BLD VENIPUNCTURE: CPT

## 2022-05-30 PROCEDURE — 83735 ASSAY OF MAGNESIUM: CPT

## 2022-05-30 PROCEDURE — 85027 COMPLETE CBC AUTOMATED: CPT

## 2022-05-30 PROCEDURE — 97530 THERAPEUTIC ACTIVITIES: CPT

## 2022-05-30 PROCEDURE — 6370000000 HC RX 637 (ALT 250 FOR IP): Performed by: INTERNAL MEDICINE

## 2022-05-30 PROCEDURE — 97161 PT EVAL LOW COMPLEX 20 MIN: CPT

## 2022-05-30 PROCEDURE — 94761 N-INVAS EAR/PLS OXIMETRY MLT: CPT

## 2022-05-30 PROCEDURE — 6370000000 HC RX 637 (ALT 250 FOR IP): Performed by: FAMILY MEDICINE

## 2022-05-30 PROCEDURE — 2580000003 HC RX 258: Performed by: FAMILY MEDICINE

## 2022-05-30 RX ORDER — CEFPODOXIME PROXETIL 200 MG/1
200 TABLET, FILM COATED ORAL 2 TIMES DAILY
Qty: 4 TABLET | Refills: 0 | Status: SHIPPED | OUTPATIENT
Start: 2022-05-30 | End: 2022-06-01

## 2022-05-30 RX ORDER — AZITHROMYCIN 250 MG/1
250 TABLET, FILM COATED ORAL DAILY
Qty: 2 TABLET | Refills: 0 | Status: SHIPPED | OUTPATIENT
Start: 2022-05-31 | End: 2022-06-02

## 2022-05-30 RX ORDER — LIDOCAINE 4 G/G
1 PATCH TOPICAL DAILY
Qty: 5 PATCH | Refills: 0 | Status: SHIPPED | OUTPATIENT
Start: 2022-05-31

## 2022-05-30 RX ADMIN — FLUTICASONE PROPIONATE 1 SPRAY: 50 SPRAY, METERED NASAL at 09:17

## 2022-05-30 RX ADMIN — GABAPENTIN 300 MG: 300 CAPSULE ORAL at 09:13

## 2022-05-30 RX ADMIN — MONTELUKAST SODIUM 1 G: 4 TABLET, CHEWABLE ORAL at 11:01

## 2022-05-30 RX ADMIN — SODIUM CHLORIDE, PRESERVATIVE FREE 10 ML: 5 INJECTION INTRAVENOUS at 09:16

## 2022-05-30 RX ADMIN — FAMOTIDINE 20 MG: 20 TABLET, FILM COATED ORAL at 09:14

## 2022-05-30 RX ADMIN — AZITHROMYCIN MONOHYDRATE 250 MG: 250 TABLET ORAL at 09:14

## 2022-05-30 RX ADMIN — GUAIFENESIN 600 MG: 600 TABLET, EXTENDED RELEASE ORAL at 09:13

## 2022-05-30 NOTE — PLAN OF CARE
Problem: Discharge Planning  Goal: Discharge to home or other facility with appropriate resources  Outcome: Adequate for Discharge     Problem: Safety - Adult  Goal: Free from fall injury  Outcome: Adequate for Discharge     Problem: Pain  Goal: Verbalizes/displays adequate comfort level or baseline comfort level  Outcome: Adequate for Discharge     Problem: Respiratory - Adult  Goal: Achieves optimal ventilation and oxygenation  Outcome: Adequate for Discharge

## 2022-05-30 NOTE — PROGRESS NOTES
Pt has been discharged, went over discharge instructions with pt who stated she has no questions. IV has been taken out. Pt stated she has all of her belongings. Work note written by Nicholas MIGUEL was printed and given to pt. Will wheel pt down when she is finished eating her lunch.

## 2022-05-30 NOTE — PROGRESS NOTES
Awoke easily this morning with no c/o. Medicated with sleep med last p.m with good results. Resp remain even, unlab with O2 intact at 2L N/C. Calls for asst to and from bathroom, as needed. Report given to James Patino RN.

## 2022-05-30 NOTE — PROGRESS NOTES
To Whom It May Concern    Amena Walker was hospitalized at [facility] under my care from 5/27 - 5/30. She was treated for acute respiratory failure with hypoxia due to community acquired left lower lobe pneumonia. She will need to complete a course of antibiotics. She is still weak and with significant pleuritic chest pain and will require a few days of rest prior to returning to work. Sincerely,     Berry Morse MD  05/30/22.

## 2022-05-30 NOTE — PROGRESS NOTES
Occupational Therapy Note:    Attempted to see patient this AM for occupational therapy evaluation session. Patient observed to be up in the room ad edilma with no device and fully dressed. She has no concerns regarding OT needs and reports dancing 3 nights/week. OT will discharge patient from caseload. Please reconsult if a need arises.      Thank you,    Juan Caputo, OT    Rehab Caseload Tracker

## 2022-05-30 NOTE — PROGRESS NOTES
PHYSICAL THERAPY Initial Assessment, Daily Note, Discharge and AM  (Link to Caseload Tracking: PT Visit Days : 1  Acknowledge Orders  Time In/Out  PT Charge Capture  Rehab Caseload Tracker    Williams Hand is a 73 y.o. female   PRIMARY DIAGNOSIS: Community acquired pneumonia of left lower lobe of lung  Acute hypoxemic respiratory failure (HCC) [J96.01]  CAP (community acquired pneumonia) due to Chlamydia species [J16.0]  Community acquired pneumonia of left lower lobe of lung [J18.9]       Reason for Referral: Difficulty in walking, Not elsewhere classified (R26.2)  Other abnormalities of gait and mobility (R26.89)  Inpatient: Payor: Karen Heath / Plan: Apolinar Grewal PPO / Product Type: Medicare /     ASSESSMENT:     REHAB RECOMMENDATIONS:   Recommendation to date pending progress:  Setting:   No further skilled therapy after discharge from hospital    Equipment:     None     ASSESSMENT:  Ms. Vikas Smith is admitted with above diagnosis and is up in room and then sitting edge of bed on arrival. Pt performed supine to sit to stand. Pt ambulated in hallway. Pt ascends/descends steps with supervision. Pt with no skilled needs. No skilled recommendations for therapy after discharge.      [facility] [address] [address] AM-PAC 6 Clicks Basic Mobility Inpatient Short Form  AM-PAC Mobility Inpatient   How much difficulty turning over in bed?: None  How much difficulty sitting down on / standing up from a chair with arms?: None  How much difficulty moving from lying on back to sitting on side of bed?: None  How much help from another person moving to and from a bed to a chair?: None  How much help from another person needed to walk in hospital room?: None  How much help from another person for climbing 3-5 steps with a railing?: None  AM-PAC Inpatient Mobility Raw Score : 24  AM-PAC Inpatient T-Scale Score : 61.14  Mobility Inpatient CMS 0-100% Score: 0  Mobility Inpatient CMS G-Code Modifier : CH    SUBJECTIVE:   Ms. Evangelistamarcos states she has no functional deficits, but reports having pain from the pneumonia.      Social/Functional Lives With: Spouse  Type of Home: House  Home Layout: One level  Home Access: Stairs to enter with rails  Entrance Stairs - Number of Steps: 3  Entrance Stairs - Rails: Both  Ambulation Assistance: Independent  Transfer Assistance: Independent  Type of Occupation: ALLYSON     OBJECTIVE:     PAIN: Persaud Mimi / O2: Coby Godinez / Marge Capps / Yolette Gottlieblow:   Pre Treatment:   Pain Assessment: 0-10  Non-Pharmaceutical Pain Intervention(s): Ambulation/Increased Activity  Response to Pain Intervention: Patient satisfied      Post Treatment: no complaints Vitals        Oxygen  O2 Therapy: Room air   None    RESTRICTIONS/PRECAUTIONS:                    GROSS EVALUATION: Intact Impaired (Comments):   AROM [x]     PROM []    Strength [x]     Balance [x]     Posture [] N/A   Sensation [x]     Coordination [x]      Tone [x]     Edema []    Activity Tolerance [] Patient Tolerated treatment well    []      COGNITION/  PERCEPTION: Intact Impaired (Comments):   Orientation [x]     Vision [x]     Hearing [x]     Cognition  [x]       MOBILITY: I Mod I S SBA CGA Min Mod Max Total  NT x2 Comments:   Bed Mobility    Rolling [] [] [] [] [] [] [] [] [] [] []    Supine to Sit [] [] [] [] [] [] [] [] [] [] []    Scooting [] [] [] [] [] [] [] [] [] [] []    Sit to Supine [] [] [] [] [] [] [] [] [] [] []    Transfers    Sit to Stand [x] [] [] [] [] [] [] [] [] [] []    Bed to Chair [] [] [] [] [] [] [] [] [] [] []    Stand to Sit [x] [] [] [] [] [] [] [] [] [] []    I=Independent, Mod I=Modified Independent, S=Supervision, SBA=Standby Assistance, CGA=Contact Guard Assistance,   Min=Minimal Assistance, Mod=Moderate Assistance, Max=Maximal Assistance, Total=Total Assistance, NT=Not Tested    GAIT: I Mod I S SBA CGA Min Mod Max Total  NT x2 Comments:   Level of Assistance [] [] [x] [] [] [] [] [] [] [] []    Distance 250 feet    DME None Gait Quality Decreased castillo     Weightbearing Status      Stairs Stairs/Curb  Stairs?: Yes  Stairs  # Steps : 5 (up and down)    I=Independent, Mod I=Modified Independent, S=Supervision, SBA=Standby Assistance, CGA=Contact Guard Assistance,   Min=Minimal Assistance, Mod=Moderate Assistance, Max=Maximal Assistance, Total=Total Assistance, NT=Not Tested    PLAN:   ACUTE PHYSICAL THERAPY GOALS:   (Developed with and agreed upon by patient and/or caregiver.)    Pt independent with ambulation. FREQUENCY AND DURATION: Daily for duration of hospital stay or until stated goals are met, whichever comes first.    THERAPY PROGNOSIS: none needed    PROBLEM LIST:   (Skilled intervention is medically necessary to address:)  NA INTERVENTIONS PLANNED:   (Benefits and precautions of physical therapy have been discussed with the patient.)  NA       TREATMENT:   EVALUATION: LOW COMPLEXITY: (Untimed Charge)    TREATMENT:   Therapeutic Activity (24 Minutes): Therapeutic activity included Transfer Training, Ambulation on level ground, Stair Training, Sitting balance  and Standing balance to improve functional Activity tolerance, Balance, Coordination, Mobility and Strength.     TREATMENT GRID:  N/A    AFTER TREATMENT PRECAUTIONS: Bed, Call light within reach, Needs within reach and RN notified    INTERDISCIPLINARY COLLABORATION:  RN/ PCT, PT/ PTA and OT/ SUTTON    EDUCATION: Education Given To: Patient    TIME IN/OUT:  Time In: 1010  Time Out: 805 Edie Lobo  Minutes: [address] [facility],

## 2022-05-30 NOTE — CARE COORDINATION
05/30/22 1314   Service Assessment   Patient Orientation Alert and Oriented;Person;Place;Situation;Self   Cognition Alert   Primary Caregiver Self   Support Systems Spouse/Significant Other   PCP Verified by CM Yes   Prior Functional Level Independent in ADLs/IADLs   Current Functional Level Independent in ADLs/IADLs   Can patient return to prior living arrangement Yes   Ability to make needs known: Good   Social/Functional History   Lives With Spouse   Type of Home House   ADL Assistance Independent   Ambulation Assistance Independent   Transfer Assistance Independent   Discharge Planning   Living Arrangements Spouse/Significant Other   Current Services Prior To Admission None   DME Ordered? No   Type of Home Care Services None   Patient expects to be discharged to: [address] [address] Discharge   Services At/After Discharge None   Condition of Participation: Discharge Planning   The Plan for Transition of Care is related to the following treatment goals: Return home with spouse   Interdisciplinary team rounds with Dr. Dania Oconnor CM, and nutrition services. Patient lives with her spouse independent of ADL's and has transportation Per therapy no f/u needed at d/c. Patient does not need oxygen at d/c.  Patient to d/c home

## 2022-05-30 NOTE — PROGRESS NOTES
05/30/22 1157   Resting (Room Air)   SpO2 94   HR 71   Resting (On O2)   SpO2 97   HR 68   O2 Device Nasal cannula   O2 Flow Rate (l/min) 2 l/min   During Walk (Room Air)   SpO2 96   HR 71   Walk/Assistance Device Ambulation   Rate of Dyspnea 0   After Walk   SpO2 97   HR 77   Rate of Dyspnea 0   Does the Patient Qualify for Home O2 No   Does the Patient Need Portable Oxygen Tanks No

## 2022-05-30 NOTE — DISCHARGE SUMMARY
Hospitalist Discharge Summary   Admit Date:  2022  3:17 PM   DC Note date: 2022  Name:  Tung Dee   Age:  73 y.o. Sex:  female  :  1950   MRN:  [id number]   Room:  [facility]  PCP:  Pastora Dominguez MD    Presenting Complaint: Shortness of Breath     Initial Admission Diagnosis: Acute hypoxemic respiratory failure ([facility] [address] [J96.01]  CAP (community acquired pneumonia) due to Chlamydia species [J16.0]  Community acquired pneumonia of left lower lobe of lung [J18.9]     Problem List for this Hospitalization (present on admission unless stated otherwise):  Principal Problem:    Community acquired pneumonia of left lower lobe of lung  Active Problems:    IBS (irritable bowel syndrome)    Neuropathy    Pleuritic chest pain  Resolved Problems:    Acute respiratory failure with hypoxia ([facility] [address]    Did Patient have Sepsis (YES OR NO): No    Hospital Course:  Please refer to the admission H&P for details of presentation. In summary, Tung Dee is a 73 y.o. female with no significant medical history who presented due to cough with shortness of breath. Patient reports that she has been battling a cold for past several days. Having sick family members. COVID test has been negative. Desaturating down to 87% on room air in ED and admitted for respiratory failure with hypoxia lower lobe pneumonia. Treated with Abx with improvement in her symptoms. Acute respiratory failure with hypoxia has resolved and she does not require O2 at rest nor on ambulation. Blood cultures obtained on admission have been negative to date. Patient is medically stable for discharge. Patient is to continue taking medications as prescribed and to follow up with PCP on discharge. Patient is instructed to to call a physician or return to ED if any concerns/symptoms worsened. Discharge summary and encounter summary was sent to PCP electronically via \"Comm Mgt\" link in [facility], if possible.       Disposition: Home  Diet: ADULT DIET; Regular  Code Status: Full Code    Follow Ups:   Follow-up Information     Britton Smith MD.    Specialty: Family Medicine  Why: As needed  Contact information:  [address]  [phone]                       Time spent in patient discharge and coordination 35 minutes. Plan was discussed with patient. All questions answered. Patient was stable at time of discharge. Instructions given to call a physician or return if any concerns. Current Discharge Medication List      START taking these medications    Details   lidocaine 4 % external patch Place 1 patch onto the skin daily  Qty: 5 patch, Refills: 0      azithromycin (ZITHROMAX) 250 MG tablet Take 1 tablet by mouth daily for 2 doses  Qty: 2 tablet, Refills: 0    Associated Diagnoses: Community acquired pneumonia of left lower lobe of lung      cefpodoxime (VANTIN) 200 mg tablet Take 1 tablet by mouth 2 times daily for 2 days  Qty: 4 tablet, Refills: 0         CONTINUE these medications which have NOT CHANGED    Details   colestipol (COLESTID) 1 g tablet Take 1 g by mouth daily      estradiol (ESTRACE) 0.5 MG tablet Take 0.5 mg by mouth daily      eszopiclone (LUNESTA) 2 MG TABS Take 2 mg by mouth nightly. Gabapentin, Once-Daily, 300 MG TABS Take 300 mg by mouth 3 times daily. Procedures done this admission:  * No surgery found *    Consults this admission:  None    Echocardiogram results:  No results found for this or any previous visit. Diagnostic Imaging/Tests:   XR CHEST (2 VW)    Result Date: 5/27/2022  History: shortness of breath Two views chest Findings: The lungs are well expanded and clear. The cardiac silhouette, and mediastinal contour, and osseous structures are normal.     Unremarkable two-view chest.     CT CHEST PULMONARY EMBOLISM W CONTRAST    Result Date: 5/27/2022  Exam: CT CHEST PULMONARY EMBOLISM W CONTRAST on 5/27/2022 6:44 PM Clinical History:  The Female patient is 73 years old  presenting for cough and lower rib pain. Technique: Thin section axial images were obtained through the chest with intravenous contrast.  Coronal reformatted images were also provided for review. A total of 100 ml of Iopamidol (ISOVUE-370) 76 % contrast was administered intravenously. All CT scans at this facility are performed using dose reduction/dose modulation techniques, as appropriate the performed exam, including the following: Automated Exposure Control; Adjustment of the mA and/or kV according to patient size (this includes techniques or standardized protocols for targeted exams where dose is matched to indication/reason for exam); and Use of Iterative Reconstruction Technique. Radiation Exposure Indices: Reference Air Kerma (Te Putt) = 361 mGy-cm Comparison:  Chest CT 8/23/2013 and chest x-ray 5/27/2022. FINDINGS: Images through the lungs demonstrate infiltrative changes in the superior left lower lobe segment and to lesser degree left lung base Normal vascular enhancement is demonstrated. No evidence of aortic dissection or aneurysmal dilatation is seen. There is no evidence of pulmonary embolic disease. There is no significant hilar or mediastinal lymphadenopathy. Images chest wall structures as well as visualized portions of the upper abdomen are unremarkable in appearance. There are no acute osseous abnormalities. 1. No evidence of pulmonary embolic disease. 2. Left lower lobe infiltrative changes consistent with pneumonia. Follow-up to resolution is recommended.  CPT code(s) S831673         Labs: Results:       BMP, Mg, Phos Recent Labs     05/28/22 0526 05/29/22  0543 05/30/22  0628    141 140   K 4.1 4.1 3.9    106 106   CO2 28 28 29   BUN 9 13 16   MG  --  2.5* 2.7*      CBC Recent Labs     05/28/22  0526 05/29/22  0543 05/30/22  0628   WBC 4.4 6.8 4.7   RBC 3.70* 3.62* 3.75*   HGB 12.0 11.9 12.2   HCT 34.7* 34.1* 35.2*    231 262      LFT Recent Labs     05/27/22  1438   ALT 32   GLOB 4.4*      Cardiac Testing No results found for: BNP, CPK, RCK1, CKMB   Coagulation Tests No results found for: INR, APTT   A1c No results found for: HBA1C   Lipid Panel No results found for: CHOL, CHOLPOCT, CHOLX, CHLST, CHOLV, 986625, HDL, HDLC, LDL, LDLC, 317602, VLDLC, VLDL, TGLX, TRIGL   Thyroid Panel No results found for: TSH, T4, FT4     Most Recent UA No results found for: MUCUS, UCOM       All Labs from Last 24 Hrs:  Recent Results (from the past 24 hour(s))   Basic Metabolic Panel    Collection Time: 05/30/22  6:28 AM   Result Value Ref Range    Sodium 140 136 - 145 mmol/L    Potassium 3.9 3.5 - 5.1 mmol/L    Chloride 106 98 - 107 mmol/L    CO2 29 21 - 32 mmol/L    Anion Gap 5 (L) 7 - 16 mmol/L    Glucose 84 65 - 100 mg/dL    BUN 16 8 - 23 MG/DL    CREATININE 0.73 0.6 - 1.0 MG/DL    GFR African American >60 >60 ml/min/1.73m2    GFR Non- >60 >60 ml/min/1.73m2    Calcium 9.0 8.3 - 10.4 MG/DL   CBC    Collection Time: 05/30/22  6:28 AM   Result Value Ref Range    WBC 4.7 4.3 - 11.1 K/uL    RBC 3.75 (L) 4.05 - 5.2 M/uL    Hemoglobin 12.2 11.7 - 15.4 g/dL    Hematocrit 35.2 (L) 35.8 - 46.3 %    MCV 93.9 79.6 - 97.8 FL    MCH 32.5 26.1 - 32.9 PG    MCHC 34.7 31.4 - 35.0 g/dL    RDW 11.4 (L) 11.9 - 14.6 %    Platelets 407 677 - 497 K/uL    MPV 9.4 9.4 - 12.3 FL    nRBC 0.00 0.0 - 0.2 K/uL   Magnesium    Collection Time: 05/30/22  6:28 AM   Result Value Ref Range    Magnesium 2.7 (H) 1.8 - 2.4 mg/dL       Allergies   Allergen Reactions    Penicillins Other (See Comments)     unsure     Immunization History   Administered Date(s) Administered    COVID-19, Pfizer Purple top, DILUTE for use, 12+ yrs, 30mcg/0.3mL dose 02/02/2021, 12/23/2021       Recent Vital Data:  Patient Vitals for the past 24 hrs:   Temp Pulse Resp BP SpO2   05/30/22 1053 97.9 °F (36.6 °C) 84 17 125/69 94 %   05/30/22 0300 97.5 °F (36.4 °C) 66 16 (!) 103/54 97 %   05/29/22 2305 98.5 °F (36.9 °C) 66 20 (!) 107/53 97 % 05/29/22 2102 -- 74 18 -- 95 %   05/29/22 2033 97.9 °F (36.6 °C) 69 18 (!) 105/52 96 %   05/29/22 1611 97.7 °F (36.5 °C) 63 17 125/60 98 %       Oxygen Therapy  SpO2: 94 %  Pulse Oximeter Device Mode: Intermittent  O2 Device: None (Room air)  O2 Flow Rate (L/min): 2 L/min    Estimated body mass index is 22.6 kg/m² as calculated from the following:    Height as of this encounter: 5' 6\" (1.676 m). Weight as of this encounter: 140 lb (63.5 kg). Intake/Output Summary (Last 24 hours) at 5/30/2022 1246  Last data filed at 5/30/2022 0630  Gross per 24 hour   Intake --   Output 2150 ml   Net -2150 ml         Physical Exam:  General:           alert and awake. No overt distress  Head:               Normocephalic, atraumatic  Eyes:               Sclerae appear normal.  Pupils equally round. ENT:                Nares appear normal, no drainage. Moist oral mucosa  Neck:               No restricted ROM. Trachea midline   CV:                  RRR. No jugular venous distension. Lungs:             Scattered rhonchi, diminished breath sound at the LLL, no wheezing. Abdomen: Bowel sounds present. Soft, nontender, nondistended. Extremities:     No cyanosis or clubbing. No edema  Skin:                No rashes and normal coloration. Warm and dry. Neuro:             CN II-XII grossly intact. A&Ox3  Psych:             Normal mood and affect.         Signed:  Alphonso Mondragon MD    Part of this note may have been written by using a voice dictation software. The note has been proof read but may still contain some grammatical/other typographical errors.

## 2022-06-01 LAB
BACTERIA SPEC CULT: NORMAL
BACTERIA SPEC CULT: NORMAL
EKG ATRIAL RATE: 86 BPM
EKG DIAGNOSIS: NORMAL
EKG P AXIS: 72 DEGREES
EKG P-R INTERVAL: 192 MS
EKG Q-T INTERVAL: 344 MS
EKG QRS DURATION: 92 MS
EKG QTC CALCULATION (BAZETT): 411 MS
EKG R AXIS: 64 DEGREES
EKG T AXIS: 63 DEGREES
EKG VENTRICULAR RATE: 86 BPM
SERVICE CMNT-IMP: NORMAL
SERVICE CMNT-IMP: NORMAL

## 2022-12-20 ENCOUNTER — HOSPITAL ENCOUNTER (EMERGENCY)
Dept: GENERAL RADIOLOGY | Age: 72
Discharge: HOME | End: 2022-12-23
Payer: MEDICARE

## 2022-12-20 ENCOUNTER — HOSPITAL ENCOUNTER (EMERGENCY)
Age: 72
Discharge: HOME | End: 2022-12-20
Attending: EMERGENCY MEDICINE | Admitting: EMERGENCY MEDICINE
Payer: MEDICARE

## 2022-12-20 VITALS
DIASTOLIC BLOOD PRESSURE: 51 MMHG | BODY MASS INDEX: 21.38 KG/M2 | HEART RATE: 83 BPM | TEMPERATURE: 99.2 F | WEIGHT: 133 LBS | HEIGHT: 66 IN | OXYGEN SATURATION: 93 % | SYSTOLIC BLOOD PRESSURE: 102 MMHG | RESPIRATION RATE: 22 BRPM

## 2022-12-20 DIAGNOSIS — J11.1 INFLUENZA WITH RESPIRATORY MANIFESTATION OTHER THAN PNEUMONIA: Primary | ICD-10-CM

## 2022-12-20 LAB
ALBUMIN SERPL-MCNC: 3.4 G/DL (ref 3.2–4.6)
ALBUMIN/GLOB SERPL: 1 {RATIO} (ref 0.4–1.6)
ALP SERPL-CCNC: 59 U/L (ref 50–130)
ALT SERPL-CCNC: 25 U/L (ref 12–65)
ANION GAP SERPL CALC-SCNC: 7 MMOL/L (ref 2–11)
APPEARANCE UR: CLEAR
AST SERPL-CCNC: 23 U/L (ref 15–37)
BACTERIA URNS QL MICRO: NEGATIVE /HPF
BASOPHILS # BLD: 0 K/UL (ref 0–0.2)
BASOPHILS NFR BLD: 1 % (ref 0–2)
BILIRUB SERPL-MCNC: 0.4 MG/DL (ref 0.2–1.1)
BILIRUB UR QL: NEGATIVE
BUN SERPL-MCNC: 10 MG/DL (ref 8–23)
CALCIUM SERPL-MCNC: 8.7 MG/DL (ref 8.3–10.4)
CASTS URNS QL MICRO: ABNORMAL /LPF
CHLORIDE SERPL-SCNC: 103 MMOL/L (ref 101–110)
CO2 SERPL-SCNC: 26 MMOL/L (ref 21–32)
COLOR UR: ABNORMAL
CREAT SERPL-MCNC: 0.86 MG/DL (ref 0.6–1)
DIFFERENTIAL METHOD BLD: ABNORMAL
EOSINOPHIL # BLD: 0 K/UL (ref 0–0.8)
EOSINOPHIL NFR BLD: 1 % (ref 0.5–7.8)
EPI CELLS #/AREA URNS HPF: ABNORMAL /HPF
ERYTHROCYTE [DISTWIDTH] IN BLOOD BY AUTOMATED COUNT: 11.2 % (ref 11.9–14.6)
FLUAV AG NPH QL IA: POSITIVE
FLUBV AG NPH QL IA: NEGATIVE
GLOBULIN SER CALC-MCNC: 3.4 G/DL (ref 2.8–4.5)
GLUCOSE SERPL-MCNC: 118 MG/DL (ref 65–100)
GLUCOSE UR STRIP.AUTO-MCNC: NEGATIVE MG/DL
HCT VFR BLD AUTO: 34 % (ref 35.8–46.3)
HGB BLD-MCNC: 11.9 G/DL (ref 11.7–15.4)
HGB UR QL STRIP: ABNORMAL
IMM GRANULOCYTES # BLD AUTO: 0 K/UL (ref 0–0.5)
IMM GRANULOCYTES NFR BLD AUTO: 0 % (ref 0–5)
KETONES UR QL STRIP.AUTO: 15 MG/DL
LACTATE SERPL-SCNC: 0.9 MMOL/L (ref 0.4–2)
LEUKOCYTE ESTERASE UR QL STRIP.AUTO: NEGATIVE
LYMPHOCYTES # BLD: 0.3 K/UL (ref 0.5–4.6)
LYMPHOCYTES NFR BLD: 4 % (ref 13–44)
MCH RBC QN AUTO: 33 PG (ref 26.1–32.9)
MCHC RBC AUTO-ENTMCNC: 35 G/DL (ref 31.4–35)
MCV RBC AUTO: 94.2 FL (ref 82–102)
MONOCYTES # BLD: 0.5 K/UL (ref 0.1–1.3)
MONOCYTES NFR BLD: 8 % (ref 4–12)
NEUTS SEG # BLD: 5.1 K/UL (ref 1.7–8.2)
NEUTS SEG NFR BLD: 86 % (ref 43–78)
NITRITE UR QL STRIP.AUTO: NEGATIVE
NRBC # BLD: 0 K/UL (ref 0–0.2)
PH UR STRIP: 5.5 [PH] (ref 5–9)
PLATELET # BLD AUTO: 296 K/UL (ref 150–450)
PMV BLD AUTO: 9.6 FL (ref 9.4–12.3)
POTASSIUM SERPL-SCNC: 3.6 MMOL/L (ref 3.5–5.1)
PROCALCITONIN SERPL-MCNC: <0.05 NG/ML (ref 0–0.49)
PROT SERPL-MCNC: 6.8 G/DL (ref 6.3–8.2)
PROT UR STRIP-MCNC: NEGATIVE MG/DL
RBC # BLD AUTO: 3.61 M/UL (ref 4.05–5.2)
RBC #/AREA URNS HPF: ABNORMAL /HPF
SARS-COV-2 RDRP RESP QL NAA+PROBE: NOT DETECTED
SODIUM SERPL-SCNC: 136 MMOL/L (ref 133–143)
SOURCE: NORMAL
SP GR UR REFRACTOMETRY: 1.01 (ref 1–1.02)
SPECIMEN SOURCE: ABNORMAL
TROPONIN I SERPL HS-MCNC: 9.4 PG/ML (ref 0–14)
UROBILINOGEN UR QL STRIP.AUTO: 0.2 EU/DL (ref 0.2–1)
WBC # BLD AUTO: 6 K/UL (ref 4.3–11.1)
WBC URNS QL MICRO: ABNORMAL /HPF

## 2022-12-20 PROCEDURE — 87804 INFLUENZA ASSAY W/OPTIC: CPT

## 2022-12-20 PROCEDURE — 81001 URINALYSIS AUTO W/SCOPE: CPT

## 2022-12-20 PROCEDURE — 87635 SARS-COV-2 COVID-19 AMP PRB: CPT

## 2022-12-20 PROCEDURE — 96365 THER/PROPH/DIAG IV INF INIT: CPT | Performed by: EMERGENCY MEDICINE

## 2022-12-20 PROCEDURE — 99285 EMERGENCY DEPT VISIT HI MDM: CPT | Performed by: EMERGENCY MEDICINE

## 2022-12-20 PROCEDURE — 85025 COMPLETE CBC W/AUTO DIFF WBC: CPT

## 2022-12-20 PROCEDURE — 87040 BLOOD CULTURE FOR BACTERIA: CPT

## 2022-12-20 PROCEDURE — 84145 PROCALCITONIN (PCT): CPT

## 2022-12-20 PROCEDURE — 84484 ASSAY OF TROPONIN QUANT: CPT

## 2022-12-20 PROCEDURE — 6360000002 HC RX W HCPCS: Performed by: STUDENT IN AN ORGANIZED HEALTH CARE EDUCATION/TRAINING PROGRAM

## 2022-12-20 PROCEDURE — 96375 TX/PRO/DX INJ NEW DRUG ADDON: CPT | Performed by: EMERGENCY MEDICINE

## 2022-12-20 PROCEDURE — 71045 X-RAY EXAM CHEST 1 VIEW: CPT

## 2022-12-20 PROCEDURE — 6370000000 HC RX 637 (ALT 250 FOR IP): Performed by: STUDENT IN AN ORGANIZED HEALTH CARE EDUCATION/TRAINING PROGRAM

## 2022-12-20 PROCEDURE — 83605 ASSAY OF LACTIC ACID: CPT

## 2022-12-20 PROCEDURE — 2580000003 HC RX 258: Performed by: EMERGENCY MEDICINE

## 2022-12-20 PROCEDURE — 80053 COMPREHEN METABOLIC PANEL: CPT

## 2022-12-20 PROCEDURE — 2580000003 HC RX 258: Performed by: STUDENT IN AN ORGANIZED HEALTH CARE EDUCATION/TRAINING PROGRAM

## 2022-12-20 RX ORDER — PREDNISONE 50 MG/1
50 TABLET ORAL DAILY
Qty: 5 TABLET | Refills: 0 | Status: SHIPPED | OUTPATIENT
Start: 2022-12-20 | End: 2022-12-25

## 2022-12-20 RX ORDER — 0.9 % SODIUM CHLORIDE 0.9 %
500 INTRAVENOUS SOLUTION INTRAVENOUS ONCE
Status: COMPLETED | OUTPATIENT
Start: 2022-12-20 | End: 2022-12-20

## 2022-12-20 RX ORDER — DEXAMETHASONE SODIUM PHOSPHATE 10 MG/ML
10 INJECTION INTRAMUSCULAR; INTRAVENOUS ONCE
Status: COMPLETED | OUTPATIENT
Start: 2022-12-20 | End: 2022-12-20

## 2022-12-20 RX ORDER — BROMPHENIRAMINE MALEATE, PSEUDOEPHEDRINE HYDROCHLORIDE, AND DEXTROMETHORPHAN HYDROBROMIDE 2; 30; 10 MG/5ML; MG/5ML; MG/5ML
5 SYRUP ORAL 4 TIMES DAILY PRN
Qty: 118 ML | Refills: 0 | Status: SHIPPED | OUTPATIENT
Start: 2022-12-20 | End: 2022-12-27

## 2022-12-20 RX ORDER — ACETAMINOPHEN 500 MG
1000 TABLET ORAL
Status: COMPLETED | OUTPATIENT
Start: 2022-12-20 | End: 2022-12-20

## 2022-12-20 RX ADMIN — ACETAMINOPHEN 1000 MG: 500 TABLET, FILM COATED ORAL at 11:19

## 2022-12-20 RX ADMIN — CEFTRIAXONE 1000 MG: 1 INJECTION, POWDER, FOR SOLUTION INTRAMUSCULAR; INTRAVENOUS at 11:30

## 2022-12-20 RX ADMIN — DEXAMETHASONE SODIUM PHOSPHATE 10 MG: 10 INJECTION INTRAMUSCULAR; INTRAVENOUS at 11:21

## 2022-12-20 RX ADMIN — SODIUM CHLORIDE 500 ML: 9 INJECTION, SOLUTION INTRAVENOUS at 11:22

## 2022-12-20 ASSESSMENT — ENCOUNTER SYMPTOMS
SHORTNESS OF BREATH: 0
WHEEZING: 0
SINUS PRESSURE: 0
BLOOD IN STOOL: 0
VOICE CHANGE: 0
CONSTIPATION: 0
ABDOMINAL PAIN: 0
RHINORRHEA: 0
VOMITING: 0
PHOTOPHOBIA: 0
NAUSEA: 0
EYE DISCHARGE: 0
COLOR CHANGE: 0
COUGH: 1
DIARRHEA: 0
APNEA: 0
EYE PAIN: 0
CHEST TIGHTNESS: 0
SORE THROAT: 0
EYE REDNESS: 0

## 2022-12-20 ASSESSMENT — PAIN DESCRIPTION - DESCRIPTORS: DESCRIPTORS: ACHING

## 2022-12-20 ASSESSMENT — PAIN SCALES - GENERAL
PAINLEVEL_OUTOF10: 10
PAINLEVEL_OUTOF10: 10

## 2022-12-20 ASSESSMENT — PAIN - FUNCTIONAL ASSESSMENT: PAIN_FUNCTIONAL_ASSESSMENT: 0-10

## 2022-12-20 ASSESSMENT — PAIN DESCRIPTION - LOCATION: LOCATION: HEAD

## 2022-12-20 NOTE — ED TRIAGE NOTES
Pt states she had bronchitis 2 weeks ago, states she completed her prescribed z rd. Pt states she now has a dry cough, body aches, and a feverish feeling.

## 2022-12-20 NOTE — ED NOTES
I have reviewed discharge instructions with the patient. The patient verbalized understanding. Patient left ED via Discharge Method: ambulatory to Home with family    Opportunity for questions and clarification provided. Patient given 3 scripts. To continue your aftercare when you leave the hospital, you may receive an automated call from our care team to check in on how you are doing. This is a free service and part of our promise to provide the best care and service to meet your aftercare needs.  If you have questions, or wish to unsubscribe from this service please call [phone]. Thank you for Choosing our [address] [place] [address] Emergency Department.         Sabino Whitaker RN  12/20/22 6899

## 2022-12-20 NOTE — Clinical Note
Daniela Robb was seen and treated in our emergency department on 12/20/2022. She may return to work on 12/26/2022. If you have any questions or concerns, please don't hesitate to call.       Merlinda Rily, [place]

## 2022-12-20 NOTE — Clinical Note
Arlette Ware was seen and treated in our emergency department on 12/20/2022. She may return to work on 12/26/2022. If you have any questions or concerns, please don't hesitate to call.       Kyler Sequeira

## 2022-12-20 NOTE — ED PROVIDER NOTES
Emergency Department Provider Note                   PCP:                Stefanie Park MD               Age: 81 y.o. Sex: female       ICD-10-CM    1. Influenza with respiratory manifestation other than pneumonia  J11.1 DME Order for Pulse Ox as OP      ===================================  ED EKG Interpretation  EKG was interpreted in the absence of a cardiologist.    Rate: 94  EKG Interpretation: EKG Interpretation: sinus rhythm  ST Segments: Normal ST segments - NO STEMI    FERNANDO DIOR 2:01 PM       DISPOSITION Decision To Discharge 12/20/2022 01:43:50 PM        MDM  Number of Diagnoses or Management Options  Influenza with respiratory manifestation other than pneumonia  Diagnosis management comments: 10:17 AM  Patient presenting with cough, body aches, fevers. Initial vitals borderline for sepsis although patient non-toxic appearing. Will start with septic workup and reassess. 11:32 AM  Chest x-ray unremarkable. White count unremarkable. Heart rate has normalized. Patient remains nonhypoxic.    12:43 PM  Work-up reassuring. Flu test positive. I do feel this adequately explains her symptoms. Based on her history, it seems like she may have had another virus 2 weeks ago and then contracted the flu 2 days ago. Therefore we will treat with antiviral therapy. I do not feel this patient has pulmonary embolism as I feel the symptoms more adequately reflect a viral syndrome especially combined with the positive flu test.  If she had a pulmonary embolism, I would expect some pleuritic pain or other risk factors. She was able to ambulate without going hypoxic. At this time I do feel she is stable for discharge and not septic. Negative Pro-Cristobal negative lactic. She will be discharged home with Xofluza, Bromfed, prednisone. Advised her to use prescribed pulse oximeter multiple times a day and return here immediately if she notices that her oxygen is less than 90%.   Patient has verbalized understanding and agreed to the plan. Discharged in stable condition. Amount and/or Complexity of Data Reviewed  Clinical lab tests: ordered and reviewed  Tests in the radiology section of CPT®: ordered and reviewed  Discuss the patient with other providers: yes (Patient history, ROS, physical exam, labs, radiological workup and all pertinent findings were discussed with attending Chapo Crawford MD   )  Independent visualization of images, tracings, or specimens: yes    Risk of Complications, Morbidity, and/or Mortality  Presenting problems: moderate  Diagnostic procedures: moderate  Management options: moderate    Patient Progress  Patient progress: improved                        ED Course as of 12/20/22 1401   Tue Dec 20, 2022   1129 11:29 AM  Chest x-ray within normal limits. White count unremarkable. [NR]   4980 12:10 PM  Flu positive. Pro shikha and lactate normal. Hovering around 90-92% o2.  Will ambulate with pulse ox [NR]   1245 12:45 PM  Ambulation with pulse ox did not help the patient go hypoxic [NR]      ED Course User Index  [NR] FERNNADO Sequeira        Orders Placed This Encounter   Procedures    Blood Culture 1    Blood Culture 2    COVID-19, Rapid    Rapid influenza A/B antigens    XR CHEST PORTABLE    Comprehensive Metabolic Panel    CBC with Auto Differential    Lactate, Sepsis    Procalcitonin    Urinalysis    Troponin    Neurologic Status Assessment    Strict intake and output    Vital Signs Per Unit Routine    Check Pulse Oximetry while ambulating    EKG 12 Lead    Saline lock IV    DME Order for Pulse Ox as OP        Medications   cefTRIAXone (ROCEPHIN) 1,000 mg in sodium chloride 0.9 % 50 mL IVPB mini-bag (0 mg IntraVENous Stopped 12/20/22 1200)   acetaminophen (TYLENOL) tablet 1,000 mg (1,000 mg Oral Given 12/20/22 1119)   dexamethasone (DECADRON) injection 10 mg (10 mg IntraVENous Given 12/20/22 1121)   0.9 % sodium chloride bolus (0 mLs IntraVENous Stopped 12/20/22 1210) Discharge Medication List as of 12/20/2022  1:33 PM        START taking these medications    Details   brompheniramine-pseudoephedrine-DM 2-30-10 MG/5ML syrup Take 5 mLs by mouth 4 times daily as needed for Cough, Disp-118 mL, R-0Print      predniSONE (DELTASONE) 50 MG tablet Take 1 tablet by mouth daily for 5 days, Disp-5 tablet, R-0Print      baloxavir marboxil (XOFLUZA, 40 MG DOSE,) 1 x 40 MG tablet Take 1 tablet by mouth once for 1 dose, Disp-1 tablet, R-0Print              Navneet Curtis is a 81 y.o. female who presents to the Emergency Department with chief complaint of    Chief Complaint   Patient presents with    Cough      72-year-old female patient presents today complaining of cough, body aches, fevers for the past 2 weeks. She reports she saw an urgent care 1 week ago where she was given a Z-Edvin and her symptoms had mostly improved up until 2 days ago when they suddenly worsened again. She reports she generally feels unwell. She also is noting some chest and back pain that is mostly when coughing. She denies constant chest pressure, syncope, weakness, pain in her arms, pain in her jaw, diaphoresis, emesis. She denies pleuritic pain, hemoptysis, dyspnea, dyspnea on exertion, leg pain or swelling, recent travel, recent surgery, malignancy, prior DVT or PE. She does report a history of pneumonia 6 months ago and says that this feels similar. She denies any abdominal pain. Patient is the primary historian and the quality does seem reliable. The history is provided by the patient. No  was used. Review of Systems   Constitutional:  Positive for chills, fatigue and fever. Negative for appetite change and unexpected weight change. HENT:  Negative for congestion, ear pain, hearing loss, postnasal drip, rhinorrhea, sinus pressure, sore throat and voice change. Eyes:  Negative for photophobia, pain, discharge, redness and visual disturbance.    Respiratory:  Positive for cough. Negative for apnea, chest tightness, shortness of breath and wheezing. Cardiovascular:  Negative for chest pain, palpitations and leg swelling. Gastrointestinal:  Negative for abdominal pain, blood in stool, constipation, diarrhea, nausea and vomiting. Endocrine: Negative for polydipsia, polyphagia and polyuria. Genitourinary:  Negative for decreased urine volume, dysuria, flank pain, frequency and hematuria. Musculoskeletal:  Positive for myalgias. Negative for arthralgias, joint swelling and neck stiffness. Skin:  Negative for color change, rash and wound. Neurological:  Negative for dizziness, syncope, speech difficulty, weakness, light-headedness and headaches. Hematological:  Negative for adenopathy. Does not bruise/bleed easily. Psychiatric/Behavioral:  Negative for confusion, self-injury, sleep disturbance and suicidal ideas. All other systems reviewed and are negative. Past Medical History:   Diagnosis Date    Gastrointestinal disorder     diverticulitis    Other ill-defined conditions(799.89)     chronic back pain        Past Surgical History:   Procedure Laterality Date    GI      part of colon removed    HYSTERECTOMY (CERVIX STATUS UNKNOWN)      OK ABDOMEN SURGERY PROC UNLISTED      part of colon removed        History reviewed. No pertinent family history.      Social History     Socioeconomic History    Marital status:      Spouse name: None    Number of children: None    Years of education: None    Highest education level: None   Tobacco Use    Smoking status: Never    Smokeless tobacco: Never   Substance and Sexual Activity    Alcohol use: Yes    Drug use: No         Penicillins     Discharge Medication List as of 12/20/2022  1:33 PM        CONTINUE these medications which have NOT CHANGED    Details   lidocaine 4 % external patch Place 1 patch onto the skin daily, TransDERmal, DAILY Starting Tue 5/31/2022, Disp-5 patch, R-0, Normal      colestipol (COLESTID) 1 g tablet Take 1 g by mouth dailyHistorical Med      estradiol (ESTRACE) 0.5 MG tablet Take 0.5 mg by mouth dailyHistorical Med      eszopiclone (LUNESTA) 2 MG TABS Take 2 mg by mouth nightly. Historical Med      Gabapentin, Once-Daily, 300 MG TABS Take 300 mg by mouth 3 times daily. Historical Med              Vitals signs and nursing note reviewed. Patient Vitals for the past 4 hrs:   Pulse Resp BP SpO2   12/20/22 1336 83 22 -- 93 %   12/20/22 1321 77 18 (!) 102/51 92 %   12/20/22 1301 85 20 (!) 103/57 94 %   12/20/22 1251 89 21 -- 92 %   12/20/22 1241 -- 22 (!) 104/50 --   12/20/22 1201 90 24 (!) 107/52 92 %   12/20/22 1141 92 24 (!) 112/56 92 %   12/20/22 1129 94 -- -- 94 %   12/20/22 1121 92 20 (!) 115/56 92 %   12/20/22 1101 95 18 (!) 112/54 93 %   12/20/22 1054 -- -- -- 92 %   12/20/22 1051 -- -- -- 93 %   12/20/22 1041 -- -- -- 92 %   12/20/22 1031 -- -- -- 92 %   12/20/22 1021 -- -- -- 92 %   12/20/22 1020 98 -- -- --          Physical Exam  Vitals and nursing note reviewed. Exam conducted with a chaperone present. Constitutional:       General: She is not in acute distress. Appearance: Normal appearance. She is normal weight. She is ill-appearing. She is not toxic-appearing or diaphoretic. Comments: Ill-appearing but nontoxic-appearing. pleasant and cooperative. Speaks in full sentences. Normal mentation. Alert and oriented x4. Wearing a winter hat and bundled in a blanket   HENT:      Head: Normocephalic and atraumatic. Right Ear: Tympanic membrane, ear canal and external ear normal. There is no impacted cerumen. Left Ear: Tympanic membrane, ear canal and external ear normal. There is no impacted cerumen. Nose: Nose normal. No congestion or rhinorrhea. Mouth/Throat:      Mouth: Mucous membranes are dry. Pharynx: Oropharynx is clear. No oropharyngeal exudate or posterior oropharyngeal erythema. Comments: Uvula midline without swelling.   Normal swallow. No drooling. Eyes:      General: No scleral icterus. Right eye: No discharge. Left eye: No discharge. Extraocular Movements: Extraocular movements intact. Conjunctiva/sclera: Conjunctivae normal.      Pupils: Pupils are equal, round, and reactive to light. Neck:      Comments: Negative meningeal signs. Negative Kernig sign. Negative Brudzinski sign. Full range of motion of neck without pain. Cardiovascular:      Rate and Rhythm: Regular rhythm. Tachycardia present. Pulses: Normal pulses. Heart sounds: Normal heart sounds. No murmur heard. Comments: Heart rate running between 98 and 105 at time of exam.  No pulse deficits. Good perfusion status. Normal capillary refill. Pulmonary:      Effort: Pulmonary effort is normal. No respiratory distress. Breath sounds: Normal breath sounds. No stridor. No wheezing, rhonchi or rales. Comments: No obvious adventitious lung sounds heard. Chest:      Chest wall: No tenderness. Abdominal:      General: Abdomen is flat. Palpations: Abdomen is soft. Tenderness: There is no abdominal tenderness. There is no right CVA tenderness, left CVA tenderness, guarding or rebound. Musculoskeletal:         General: No tenderness. Normal range of motion. Cervical back: Normal range of motion and neck supple. No rigidity or tenderness. Right lower leg: No edema. Left lower leg: No edema. Comments: Bilateral lower extremities without swelling or erythema or warmth or signs of DVT   Lymphadenopathy:      Cervical: No cervical adenopathy. Skin:     General: Skin is warm and dry. Capillary Refill: Capillary refill takes less than 2 seconds. Coloration: Skin is not jaundiced or pale. Neurological:      General: No focal deficit present. Mental Status: She is alert and oriented to person, place, and time. Mental status is at baseline.    Psychiatric:         Mood and Affect: Mood normal.         Behavior: Behavior normal.         Thought Content: Thought content normal.         Judgment: Judgment normal.        Procedures    Results for orders placed or performed during the hospital encounter of 12/20/22   COVID-19, Rapid    Specimen: Nasopharyngeal   Result Value Ref Range    Source Nasopharyngeal      SARS-CoV-2, Rapid Not detected NOTD     Rapid influenza A/B antigens    Specimen: Nasal Washing   Result Value Ref Range    Influenza A Ag Positive (A) NEG      Influenza B Ag Negative NEG      Source Nasopharyngeal     XR CHEST PORTABLE    Narrative    AP PORTABLE CHEST X-RAY 12/20/2022 10:38 AM    HISTORY: Sepsis    COMPARISON: May 27, 2022    FINDINGS: There is no lobar consolidation, pleural effusions or pulmonary edema. Impression    No consolidation.      Comprehensive Metabolic Panel   Result Value Ref Range    Sodium 136 133 - 143 mmol/L    Potassium 3.6 3.5 - 5.1 mmol/L    Chloride 103 101 - 110 mmol/L    CO2 26 21 - 32 mmol/L    Anion Gap 7 2 - 11 mmol/L    Glucose 118 (H) 65 - 100 mg/dL    BUN 10 8 - 23 MG/DL    Creatinine 0.86 0.6 - 1.0 MG/DL    Est, Glom Filt Rate >60 >60 ml/min/1.73m2    Calcium 8.7 8.3 - 10.4 MG/DL    Total Bilirubin 0.4 0.2 - 1.1 MG/DL    ALT 25 12 - 65 U/L    AST 23 15 - 37 U/L    Alk Phosphatase 59 50 - 130 U/L    Total Protein 6.8 6.3 - 8.2 g/dL    Albumin 3.4 3.2 - 4.6 g/dL    Globulin 3.4 2.8 - 4.5 g/dL    Albumin/Globulin Ratio 1.0 0.4 - 1.6     CBC with Auto Differential   Result Value Ref Range    WBC 6.0 4.3 - 11.1 K/uL    RBC 3.61 (L) 4.05 - 5.2 M/uL    Hemoglobin 11.9 11.7 - 15.4 g/dL    Hematocrit 34.0 (L) 35.8 - 46.3 %    MCV 94.2 82.0 - 102.0 FL    MCH 33.0 (H) 26.1 - 32.9 PG    MCHC 35.0 31.4 - 35.0 g/dL    RDW 11.2 (L) 11.9 - 14.6 %    Platelets 942 830 - 837 K/uL    MPV 9.6 9.4 - 12.3 FL    nRBC 0.00 0.0 - 0.2 K/uL    Differential Type AUTOMATED      Seg Neutrophils 86 (H) 43 - 78 %    Lymphocytes 4 (L) 13 - 44 %    Monocytes 8 4.0 - 12.0 %    Eosinophils % 1 0.5 - 7.8 %    Basophils 1 0.0 - 2.0 %    Immature Granulocytes 0 0.0 - 5.0 %    Segs Absolute 5.1 1.7 - 8.2 K/UL    Absolute Lymph # 0.3 (L) 0.5 - 4.6 K/UL    Absolute Mono # 0.5 0.1 - 1.3 K/UL    Absolute Eos # 0.0 0.0 - 0.8 K/UL    Basophils Absolute 0.0 0.0 - 0.2 K/UL    Absolute Immature Granulocyte 0.0 0.0 - 0.5 K/UL   Lactate, Sepsis   Result Value Ref Range    Lactic Acid, Sepsis 0.9 0.4 - 2.0 MMOL/L   Procalcitonin   Result Value Ref Range    Procalcitonin <0.05 0.00 - 0.49 ng/mL   Urinalysis   Result Value Ref Range    Color, UA YELLOW/STRAW      Appearance CLEAR      Specific Gravity, UA 1.015 1.001 - 1.023      pH, Urine 5.5 5.0 - 9.0      Protein, UA Negative NEG mg/dL    Glucose, UA Negative mg/dL    Ketones, Urine 15 (A) NEG mg/dL    Bilirubin Urine Negative NEG      Blood, Urine SMALL (A) NEG      Urobilinogen, Urine 0.2 0.2 - 1.0 EU/dL    Nitrite, Urine Negative NEG      Leukocyte Esterase, Urine Negative NEG      WBC, UA 0-4 U4 /hpf    RBC, UA 0-5 U5 /hpf    Epithelial Cells UA 0-5 U5 /hpf    BACTERIA, URINE Negative NEG /hpf    Casts 0-2 U2 /lpf   Troponin   Result Value Ref Range    Troponin, High Sensitivity 9.4 0 - 14 pg/mL        XR CHEST PORTABLE   Final Result   No consolidation. Voice dictation software was used during the making of this note. This software is not perfect and grammatical and other typographical errors may be present. This note has not been completely proofread for errors.      Kyler Oliver  12/20/22 [id number]

## 2022-12-20 NOTE — DISCHARGE INSTRUCTIONS
Your flu test was positive today. Your chest x-ray was normal.  Your lab work is reassuring. I do feel you are safe to be discharged home at this time. Take the antiviral flu medication today to hopefully reduce the length and intensity of your symptoms. You may use the cough medication for cough. Take the steroids for the next 5 days to reduce your symptoms. As we discussed, it is important to use the pulse oximeter multiple times a day to check your oxygen. If it is below 90%, please come to the ER for reevaluation. Please follow-up with her primary care provider in 2 to 3 days for reassessment. As we discussed, I did not find a life threatening cause of your symptoms today. However, THAT DOES NOT MEAN IT COULD NOT DEVELOP. If you develop ANY new or worsening symptoms, it is critical that you return for re-evaluation. This includes any symptoms that are concerning to you, especially symptoms such as fevers not responsive to medication, shortness of breath, chest pain. If you do not return for re-evaluation, you risk serious complications, including death.

## 2022-12-25 LAB
BACTERIA SPEC CULT: NORMAL
SERVICE CMNT-IMP: NORMAL

## 2023-01-05 ENCOUNTER — HOSPITAL ENCOUNTER (EMERGENCY)
Dept: GENERAL RADIOLOGY | Age: 73
End: 2023-01-05
Payer: MEDICARE

## 2023-01-05 ENCOUNTER — HOSPITAL ENCOUNTER (EMERGENCY)
Age: 73
Discharge: HOME | End: 2023-01-05
Attending: EMERGENCY MEDICINE
Payer: MEDICARE

## 2023-01-05 VITALS
BODY MASS INDEX: 20.57 KG/M2 | DIASTOLIC BLOOD PRESSURE: 55 MMHG | WEIGHT: 128 LBS | HEART RATE: 71 BPM | HEIGHT: 66 IN | TEMPERATURE: 98.6 F | RESPIRATION RATE: 16 BRPM | SYSTOLIC BLOOD PRESSURE: 127 MMHG | OXYGEN SATURATION: 96 %

## 2023-01-05 DIAGNOSIS — R53.83 OTHER FATIGUE: Primary | ICD-10-CM

## 2023-01-05 LAB
ALBUMIN SERPL-MCNC: 3.6 G/DL (ref 3.2–4.6)
ALBUMIN/GLOB SERPL: 1.1 {RATIO} (ref 0.4–1.6)
ALP SERPL-CCNC: 52 U/L (ref 50–130)
ALT SERPL-CCNC: 24 U/L (ref 12–65)
ANION GAP SERPL CALC-SCNC: 5 MMOL/L (ref 2–11)
AST SERPL-CCNC: 16 U/L (ref 15–37)
BASOPHILS # BLD: 0 K/UL (ref 0–0.2)
BASOPHILS NFR BLD: 0 % (ref 0–2)
BILIRUB SERPL-MCNC: 0.6 MG/DL (ref 0.2–1.1)
BUN SERPL-MCNC: 8 MG/DL (ref 8–23)
CALCIUM SERPL-MCNC: 9.3 MG/DL (ref 8.3–10.4)
CHLORIDE SERPL-SCNC: 108 MMOL/L (ref 101–110)
CO2 SERPL-SCNC: 29 MMOL/L (ref 21–32)
CREAT SERPL-MCNC: 0.76 MG/DL (ref 0.6–1)
DIFFERENTIAL METHOD BLD: ABNORMAL
EOSINOPHIL # BLD: 0.1 K/UL (ref 0–0.8)
EOSINOPHIL NFR BLD: 2 % (ref 0.5–7.8)
ERYTHROCYTE [DISTWIDTH] IN BLOOD BY AUTOMATED COUNT: 11.2 % (ref 11.9–14.6)
GLOBULIN SER CALC-MCNC: 3.2 G/DL (ref 2.8–4.5)
GLUCOSE SERPL-MCNC: 120 MG/DL (ref 65–100)
HCT VFR BLD AUTO: 37.8 % (ref 35.8–46.3)
HGB BLD-MCNC: 13.2 G/DL (ref 11.7–15.4)
IMM GRANULOCYTES # BLD AUTO: 0 K/UL (ref 0–0.5)
IMM GRANULOCYTES NFR BLD AUTO: 0 % (ref 0–5)
LACTATE SERPL-SCNC: 0.6 MMOL/L (ref 0.4–2)
LIPASE SERPL-CCNC: 206 U/L (ref 73–393)
LYMPHOCYTES # BLD: 1.5 K/UL (ref 0.5–4.6)
LYMPHOCYTES NFR BLD: 27 % (ref 13–44)
MCH RBC QN AUTO: 32.6 PG (ref 26.1–32.9)
MCHC RBC AUTO-ENTMCNC: 34.9 G/DL (ref 31.4–35)
MCV RBC AUTO: 93.3 FL (ref 82–102)
MONOCYTES # BLD: 0.6 K/UL (ref 0.1–1.3)
MONOCYTES NFR BLD: 11 % (ref 4–12)
NEUTS SEG # BLD: 3.2 K/UL (ref 1.7–8.2)
NEUTS SEG NFR BLD: 60 % (ref 43–78)
NRBC # BLD: 0 K/UL (ref 0–0.2)
PLATELET # BLD AUTO: 341 K/UL (ref 150–450)
PMV BLD AUTO: 9.6 FL (ref 9.4–12.3)
POTASSIUM SERPL-SCNC: 3.7 MMOL/L (ref 3.5–5.1)
PROT SERPL-MCNC: 6.8 G/DL (ref 6.3–8.2)
RBC # BLD AUTO: 4.05 M/UL (ref 4.05–5.2)
SODIUM SERPL-SCNC: 142 MMOL/L (ref 133–143)
WBC # BLD AUTO: 5.3 K/UL (ref 4.3–11.1)

## 2023-01-05 PROCEDURE — 83690 ASSAY OF LIPASE: CPT

## 2023-01-05 PROCEDURE — 80053 COMPREHEN METABOLIC PANEL: CPT

## 2023-01-05 PROCEDURE — 99284 EMERGENCY DEPT VISIT MOD MDM: CPT

## 2023-01-05 PROCEDURE — 85025 COMPLETE CBC W/AUTO DIFF WBC: CPT

## 2023-01-05 PROCEDURE — 83605 ASSAY OF LACTIC ACID: CPT

## 2023-01-05 PROCEDURE — 71046 X-RAY EXAM CHEST 2 VIEWS: CPT

## 2023-01-05 ASSESSMENT — PAIN DESCRIPTION - LOCATION: LOCATION: GENERALIZED

## 2023-01-05 ASSESSMENT — PAIN SCALES - GENERAL: PAINLEVEL_OUTOF10: 8

## 2023-01-05 ASSESSMENT — PAIN - FUNCTIONAL ASSESSMENT: PAIN_FUNCTIONAL_ASSESSMENT: 0-10

## 2023-01-05 NOTE — ED TRIAGE NOTES
Patient advises that she was seen here on 12/20 diagnosed with Flu A and has had a continued generalized weakness. Patient advises that she was seen at Urgent Care with blood drawn, urine obtained and 1 liter of NS administered and Zofran. Patient was sent here for CT due to recurrent diarrhea and history of colon issues. Patient with no abdominal pain at this time.

## 2023-01-06 ASSESSMENT — ENCOUNTER SYMPTOMS
SHORTNESS OF BREATH: 0
ABDOMINAL PAIN: 0
COLOR CHANGE: 0
COUGH: 1
DIARRHEA: 1
NAUSEA: 0
SORE THROAT: 0
VOMITING: 0

## 2023-01-06 NOTE — ED PROVIDER NOTES
Emergency Department Provider Note                   PCP:                Anastasia Kapadia DO               Age: 81 y.o. Sex: female       ICD-10-CM    1. Other fatigue  R53.83           DISPOSITION Decision To Discharge 01/05/2023 08:47:37 PM        Medical Decision Making  Vital signs reviewed, patient stable, NAD, afebrile, nontoxic in appearance     Based on patient's age, history, considering possible admission depending on laboratory findings and imaging findings. CBC, CMP, lipase, lactic acid obtained out of triage    Will obtain 2 view chest x-ray due to continued cough, diagnosis of influenza over 2 weeks ago and continued fatigue    Will obtain urinalysis    No leukocytosis, no leukopenia, no anemia on CBC  No concerning findings on CMP  Lipase within normal limits at 206  Lactic acid well within normal limits at 0.6    2 view chest x-ray clear. No acute cardiopulmonary abnormality, no pneumothorax, no pneumonia, no pleural effusion    Physical exam is very reassuring. Abdomen is soft and nontender throughout. No tenderness to palpation throughout pelvis. Lungs are clear to auscultation bilaterally. Negative egophony, posterior oropharynx is clear, bilateral tympanic membranes are pearly gray with appropriate light reflex, no cervical adenopathy. Patient states that when she was seen at  they mentioned that maybe she needs a CT of her abdomen and pelvis due to her diarrhea. Patient states that the diarrhea that she has been having is consistent with her typical IBS flareups which she states she is currently going through. On physical exam abdomen is quite soft without tenderness to palpation throughout. No guarding, no rebound. No leukocytosis, no leukopenia noted on CBC, no anemia. I do not feel any CT imaging of patient's abdomen or pelvis is warranted at this time. Urinalysis negative for leukocyte Estrace, nitrites. No indication of cystitis at this time.     I personally ambulated patient and monitored O2 saturation. O2 saturation between 97 and 95%. Briefly dipped to 95% but at that time waveform was poor. Stated 97% with good waveforms. No indication based on today's work-up that patient needs to be admitted to the hospital.        I do not feel additional lab work or imaging is warranted at this time. No urgent or emergent findings. Patient is stable and can be discharged home with follow-up to her primary care. It is possible that patient's continued fatigue could be a postviral syndrome +/- patient's decreased oral intake of foods. I discussed with patient that she could use Gatorade, ensures or meal replacement drinks. Patient states she cannot drink Gatorade not even sugar-free as it upsets her IBS. Patient states she is lactose intolerant and cannot take Ensure. I discussed with patient that she does need energy in the form of calories and that there are multiple meal replacement drinks that she could consume if she does not have an appetite that are not dairy-based. Patient and  verbalized that they understand and they agree. I discussed physical exam findings, laboratory findings, imaging findings, treatment and follow-up with patient and her . Answered any questions that they had. I discussed signs and symptoms that would warrant a prompt return to the emergency department included these in discharge paperwork as well. Amount and/or Complexity of Data Reviewed  Independent Historian: spouse     Details: History obtained from both patient and her spouse  External Data Reviewed: notes. Labs: ordered. Decision-making details documented in ED Course. Radiology: ordered and independent interpretation performed. Decision-making details documented in ED Course. ECG/medicine tests:      Details: no indication for ekg at this time    Risk  OTC drugs.          ED Course as of 01/06/23 0431   Thu Jan 05, 2023 2046 I performed ambulatory PO2. Oxygen saturation stayed at 97%. Briefly dropped to 95 percent but raised back up to 97. Good waveforms [JG]      ED Course User Index  [JG] FERNANDO Jenkins        Orders Placed This Encounter   Procedures    XR CHEST (2 VW)    CBC with Diff    CMP    Lipase    Lactate, Sepsis (Select if patient is over 65 to rule out mesenteric ischemia)    POCT Urine Dipstick    Saline lock IV        Medications - No data to display    Discharge Medication List as of 1/5/2023  8:50 PM           Giuseppe Paredes is a 81 y.o. female who presents to the Emergency Department with chief complaint of    Chief Complaint   Patient presents with    Diarrhea    Fatigue      74-year-old female with history of IBS, fibromyalgia, presents to the emergency department today with chief complaint of continued fatigue after diagnosis of influenza 12/20/22. Patient states that on12/30/22 she was diagnosed with bronchitis and given a Z-Edvin. States that last week her IBS flared up as well. States that she has been having diarrhea and continued weakness and chills as well as cough. Patient states that she has not been eating much and has only been drinking water due to her IBS flareup and states that things like Gatorade worsen her diarrhea. Denies any fevers, nausea, vomiting, chest pain, shortness of breath, headache. Patient states nothing makes her condition better. Nothing makes her condition worse. No treatments have been tried. The history is provided by the patient and the spouse. No  was used. Review of Systems   Constitutional:  Positive for chills. Negative for fatigue and fever. HENT:  Negative for congestion and sore throat. Respiratory:  Positive for cough. Negative for shortness of breath. Cardiovascular:  Negative for chest pain and palpitations. Gastrointestinal:  Positive for diarrhea. Negative for abdominal pain, nausea and vomiting.    Musculoskeletal:  Negative for myalgias. Skin:  Negative for color change. Neurological:  Negative for dizziness, tremors, seizures, syncope, facial asymmetry, speech difficulty, weakness, light-headedness, numbness and headaches. All other systems reviewed and are negative. Past Medical History:   Diagnosis Date    Gastrointestinal disorder     diverticulitis    Other ill-defined conditions(799.89)     chronic back pain        Past Surgical History:   Procedure Laterality Date    GI      part of colon removed    HYSTERECTOMY (CERVIX STATUS UNKNOWN)      SC UNLISTED PROCEDURE ABDOMEN PERITONEUM & OMENTUM      part of colon removed        No family history on file. Social History     Socioeconomic History    Marital status:      Spouse name: None    Number of children: None    Years of education: None    Highest education level: None   Tobacco Use    Smoking status: Never    Smokeless tobacco: Never   Substance and Sexual Activity    Alcohol use: Yes    Drug use: No         Cymbalta [duloxetine hcl] and Penicillins     Discharge Medication List as of 1/5/2023  8:50 PM        CONTINUE these medications which have NOT CHANGED    Details   lidocaine 4 % external patch Place 1 patch onto the skin daily, TransDERmal, DAILY Starting Tue 5/31/2022, Disp-5 patch, R-0, Normal      colestipol (COLESTID) 1 g tablet Take 1 g by mouth dailyHistorical Med      estradiol (ESTRACE) 0.5 MG tablet Take 0.5 mg by mouth dailyHistorical Med      eszopiclone (LUNESTA) 2 MG TABS Take 2 mg by mouth nightly. Historical Med      Gabapentin, Once-Daily, 300 MG TABS Take 300 mg by mouth 3 times daily. Historical Med              Vitals signs and nursing note reviewed. No data found. Physical Exam  Vitals and nursing note reviewed. Constitutional:       General: She is not in acute distress. Appearance: Normal appearance. She is not ill-appearing, toxic-appearing or diaphoretic. HENT:      Head: Normocephalic and atraumatic.       Right Ear: Tympanic membrane, ear canal and external ear normal.      Left Ear: Tympanic membrane, ear canal and external ear normal.      Nose: Nose normal.      Mouth/Throat:      Mouth: Mucous membranes are moist.      Pharynx: Oropharynx is clear. Eyes:      General: No scleral icterus. Extraocular Movements: Extraocular movements intact. Conjunctiva/sclera: Conjunctivae normal.   Cardiovascular:      Rate and Rhythm: Normal rate. Pulses: Normal pulses. Heart sounds: Normal heart sounds. Pulmonary:      Effort: Pulmonary effort is normal.      Breath sounds: Normal breath sounds. Comments: Negative egophony bilaterally  Abdominal:      General: Bowel sounds are normal.      Palpations: Abdomen is soft. Tenderness: There is no abdominal tenderness. There is no guarding or rebound. Comments: Abdomen is soft and nontender throughout. No tenderness to light or deep palpation. No tenderness throughout pelvic region, no guarding, no rebound   Musculoskeletal:         General: Normal range of motion. Cervical back: Normal range of motion. Right lower leg: No edema. Left lower leg: No edema. Lymphadenopathy:      Cervical: No cervical adenopathy. Skin:     General: Skin is warm and dry. Capillary Refill: Capillary refill takes less than 2 seconds. Coloration: Skin is not jaundiced or pale. Findings: No bruising, erythema, lesion or rash. Neurological:      General: No focal deficit present. Mental Status: She is alert and oriented to person, place, and time. Psychiatric:         Mood and Affect: Mood normal.         Behavior: Behavior normal.         Thought Content:  Thought content normal.         Judgment: Judgment normal.        Procedures    Results for orders placed or performed during the hospital encounter of 01/05/23   XR CHEST (2 VW)    Narrative    Chest X-ray    INDICATION: Productive cough, fatigue    PA and lateral views of the chest were obtained. FINDINGS: The lungs are clear. There are no infiltrates or effusions. The heart  size is normal.  The bony thorax is intact.         Impression    No acute findings in the chest     CBC with Diff   Result Value Ref Range    WBC 5.3 4.3 - 11.1 K/uL    RBC 4.05 4.05 - 5.2 M/uL    Hemoglobin 13.2 11.7 - 15.4 g/dL    Hematocrit 37.8 35.8 - 46.3 %    MCV 93.3 82.0 - 102.0 FL    MCH 32.6 26.1 - 32.9 PG    MCHC 34.9 31.4 - 35.0 g/dL    RDW 11.2 (L) 11.9 - 14.6 %    Platelets 746 266 - 693 K/uL    MPV 9.6 9.4 - 12.3 FL    nRBC 0.00 0.0 - 0.2 K/uL    Differential Type AUTOMATED      Seg Neutrophils 60 43 - 78 %    Lymphocytes 27 13 - 44 %    Monocytes 11 4.0 - 12.0 %    Eosinophils % 2 0.5 - 7.8 %    Basophils 0 0.0 - 2.0 %    Immature Granulocytes 0 0.0 - 5.0 %    Segs Absolute 3.2 1.7 - 8.2 K/UL    Absolute Lymph # 1.5 0.5 - 4.6 K/UL    Absolute Mono # 0.6 0.1 - 1.3 K/UL    Absolute Eos # 0.1 0.0 - 0.8 K/UL    Basophils Absolute 0.0 0.0 - 0.2 K/UL    Absolute Immature Granulocyte 0.0 0.0 - 0.5 K/UL   CMP   Result Value Ref Range    Sodium 142 133 - 143 mmol/L    Potassium 3.7 3.5 - 5.1 mmol/L    Chloride 108 101 - 110 mmol/L    CO2 29 21 - 32 mmol/L    Anion Gap 5 2 - 11 mmol/L    Glucose 120 (H) 65 - 100 mg/dL    BUN 8 8 - 23 MG/DL    Creatinine 0.76 0.6 - 1.0 MG/DL    Est, Glom Filt Rate >60 >60 ml/min/1.73m2    Calcium 9.3 8.3 - 10.4 MG/DL    Total Bilirubin 0.6 0.2 - 1.1 MG/DL    ALT 24 12 - 65 U/L    AST 16 15 - 37 U/L    Alk Phosphatase 52 50 - 130 U/L    Total Protein 6.8 6.3 - 8.2 g/dL    Albumin 3.6 3.2 - 4.6 g/dL    Globulin 3.2 2.8 - 4.5 g/dL    Albumin/Globulin Ratio 1.1 0.4 - 1.6     Lipase   Result Value Ref Range    Lipase 206 73 - 393 U/L   Lactate, Sepsis (Select if patient is over 65 to rule out mesenteric ischemia)   Result Value Ref Range    Lactic Acid, Sepsis 0.6 0.4 - 2.0 MMOL/L        XR CHEST (2 VW)   Final Result   No acute findings in the chest                             Voice dictation software was used during the making of this note. This software is not perfect and grammatical and other typographical errors may be present. This note has not been completely proofread for errors.       Kathia Aguila, [id number]  01/06/23 8202

## 2023-01-06 NOTE — DISCHARGE INSTRUCTIONS
You were evaluated in the emergency department for fatigue  Lab work today is very reassuring  Chest x-ray is clear    Recommend low sugar Gatorade, meal supplements, eating meals and small amounts    Contact your primary care provider tomorrow to schedule follow-up next week  Return to the emergency department if you have chest pain, shortness of breath, signs and symptoms of stroke as listed in your discharge paperwork

## 2023-01-06 NOTE — ED NOTES
I have reviewed discharge instructions with the patient and spouse. The patient and spouse verbalized understanding. Patient left ED via Discharge Method: ambulatory to Home with spouse. Opportunity for questions and clarification provided. Patient given 0 scripts. To continue your aftercare when you leave the hospital, you may receive an automated call from our care team to check in on how you are doing. This is a free service and part of our promise to provide the best care and service to meet your aftercare needs.  If you have questions, or wish to unsubscribe from this service please call [phone]. Thank you for Choosing our [facility] Emergency Department.       Brandin Chang RN  01/05/23 2100

## 2024-01-04 ENCOUNTER — APPOINTMENT (OUTPATIENT)
Dept: GENERAL RADIOLOGY | Age: 74
End: 2024-01-04
Payer: MEDICARE

## 2024-01-04 ENCOUNTER — HOSPITAL ENCOUNTER (EMERGENCY)
Age: 74
Discharge: HOME | End: 2024-01-04
Attending: STUDENT IN AN ORGANIZED HEALTH CARE EDUCATION/TRAINING PROGRAM
Payer: MEDICARE

## 2024-01-04 VITALS
RESPIRATION RATE: 18 BRPM | HEIGHT: 66 IN | BODY MASS INDEX: 22.5 KG/M2 | DIASTOLIC BLOOD PRESSURE: 59 MMHG | TEMPERATURE: 98.6 F | HEART RATE: 68 BPM | WEIGHT: 140 LBS | OXYGEN SATURATION: 97 % | SYSTOLIC BLOOD PRESSURE: 109 MMHG

## 2024-01-04 DIAGNOSIS — R07.9 CHEST PAIN, UNSPECIFIED TYPE: Primary | ICD-10-CM

## 2024-01-04 LAB
ANION GAP SERPL CALC-SCNC: 5 MMOL/L (ref 2–11)
BASOPHILS # BLD: 0 K/UL (ref 0–0.2)
BASOPHILS NFR BLD: 1 % (ref 0–2)
BUN SERPL-MCNC: 17 MG/DL (ref 8–23)
CALCIUM SERPL-MCNC: 8.7 MG/DL (ref 8.3–10.4)
CHLORIDE SERPL-SCNC: 109 MMOL/L (ref 103–113)
CO2 SERPL-SCNC: 25 MMOL/L (ref 21–32)
CREAT SERPL-MCNC: 0.7 MG/DL (ref 0.6–1)
DIFFERENTIAL METHOD BLD: ABNORMAL
EKG ATRIAL RATE: 60 BPM
EKG DIAGNOSIS: NORMAL
EKG P AXIS: 63 DEGREES
EKG P-R INTERVAL: 208 MS
EKG Q-T INTERVAL: 414 MS
EKG QRS DURATION: 96 MS
EKG QTC CALCULATION (BAZETT): 414 MS
EKG R AXIS: 68 DEGREES
EKG T AXIS: 66 DEGREES
EKG VENTRICULAR RATE: 60 BPM
EOSINOPHIL # BLD: 0.1 K/UL (ref 0–0.8)
EOSINOPHIL NFR BLD: 1 % (ref 0.5–7.8)
ERYTHROCYTE [DISTWIDTH] IN BLOOD BY AUTOMATED COUNT: 11.5 % (ref 11.9–14.6)
GLUCOSE SERPL-MCNC: 93 MG/DL (ref 65–100)
HCT VFR BLD AUTO: 36 % (ref 35.8–46.3)
HGB BLD-MCNC: 12.6 G/DL (ref 11.7–15.4)
IMM GRANULOCYTES # BLD AUTO: 0 K/UL (ref 0–0.5)
IMM GRANULOCYTES NFR BLD AUTO: 0 % (ref 0–5)
LYMPHOCYTES # BLD: 2.3 K/UL (ref 0.5–4.6)
LYMPHOCYTES NFR BLD: 38 % (ref 13–44)
MCH RBC QN AUTO: 32.7 PG (ref 26.1–32.9)
MCHC RBC AUTO-ENTMCNC: 35 G/DL (ref 31.4–35)
MCV RBC AUTO: 93.5 FL (ref 82–102)
MONOCYTES # BLD: 0.7 K/UL (ref 0.1–1.3)
MONOCYTES NFR BLD: 11 % (ref 4–12)
NEUTS SEG # BLD: 2.9 K/UL (ref 1.7–8.2)
NEUTS SEG NFR BLD: 49 % (ref 43–78)
NRBC # BLD: 0 K/UL (ref 0–0.2)
PLATELET # BLD AUTO: 317 K/UL (ref 150–450)
PMV BLD AUTO: 9.6 FL (ref 9.4–12.3)
POTASSIUM SERPL-SCNC: 3.4 MMOL/L (ref 3.5–5.1)
RBC # BLD AUTO: 3.85 M/UL (ref 4.05–5.2)
SODIUM SERPL-SCNC: 139 MMOL/L (ref 136–146)
TROPONIN I SERPL HS-MCNC: 6.9 PG/ML (ref 0–14)
TROPONIN I SERPL HS-MCNC: 8 PG/ML (ref 0–14)
WBC # BLD AUTO: 6 K/UL (ref 4.3–11.1)

## 2024-01-04 PROCEDURE — 93005 ELECTROCARDIOGRAM TRACING: CPT | Performed by: STUDENT IN AN ORGANIZED HEALTH CARE EDUCATION/TRAINING PROGRAM

## 2024-01-04 PROCEDURE — 99285 EMERGENCY DEPT VISIT HI MDM: CPT

## 2024-01-04 PROCEDURE — 80048 BASIC METABOLIC PNL TOTAL CA: CPT

## 2024-01-04 PROCEDURE — 93010 ELECTROCARDIOGRAM REPORT: CPT | Performed by: INTERNAL MEDICINE

## 2024-01-04 PROCEDURE — 71046 X-RAY EXAM CHEST 2 VIEWS: CPT

## 2024-01-04 PROCEDURE — 84484 ASSAY OF TROPONIN QUANT: CPT

## 2024-01-04 PROCEDURE — 85025 COMPLETE CBC W/AUTO DIFF WBC: CPT

## 2024-01-04 ASSESSMENT — HEART SCORE: ECG: 0

## 2024-01-04 ASSESSMENT — PAIN SCALES - GENERAL
PAINLEVEL_OUTOF10: 3
PAINLEVEL_OUTOF10: 0

## 2024-01-04 ASSESSMENT — PAIN - FUNCTIONAL ASSESSMENT: PAIN_FUNCTIONAL_ASSESSMENT: 0-10

## 2024-01-04 ASSESSMENT — LIFESTYLE VARIABLES
HOW OFTEN DO YOU HAVE A DRINK CONTAINING ALCOHOL: NEVER
HOW MANY STANDARD DRINKS CONTAINING ALCOHOL DO YOU HAVE ON A TYPICAL DAY: PATIENT DOES NOT DRINK

## 2024-01-04 ASSESSMENT — PAIN SCALES - WONG BAKER: WONGBAKER_NUMERICALRESPONSE: 0

## 2024-01-04 NOTE — ED NOTES
I have reviewed discharge instructions with the patient.  The patient verbalized understanding.    Patient left ED via Discharge Method: ambulatory to Home with .  Opportunity for questions and clarification provided.       Patient given 0 scripts.                Pau Farrell, RN  01/04/24 0434

## 2024-01-04 NOTE — DISCHARGE INSTRUCTIONS
Please call the cardiology clinic tomorrow to set up an appointment.  Return to the ER for any new or worsening symptoms.  I do recommend taking a 81 mg aspirin daily    [facility]  [address] [place] [address], [address]  [place] [address]  [phone]

## 2024-01-04 NOTE — ED PROVIDER NOTES
[facility]  Emergency Department    DISPOSITION Decision To Discharge 2024 04:22:59 AM       ICD-10-CM    1. Chest pain, unspecified type  R07.9 [facility] [place]        ED Course     ED Course as of 24 0503   Thu 2024   0120 73-year-old female no pertinent past medical history presents with a 1 hour history of substernal chest pain rating in the left neck that has now resolved following aspirin and nitro.  Vitals reassuring.  Last stress test 4 years ago at [facility], however I am unable to view this in the records.  Will obtain labs including serial cardiac enzymes [ER]   0424 Blood work with normal limits including troponin x 2.  CXR negative.  She feels improved on reassessment and is chest pain-free.  Did discuss consideration of admission for high risk chest pain, however she felt comfortable with discharge home to follow-up with her cardiologist. [ER]      ED Course User Index  [ER] Tyson Rodrigez MD     Complexity of Problems Addressed:  1 or more acute illnesses that pose a threat to life or bodily function    Data Reviewed and Analyzed:  Category 1:   I independently ordered and reviewed each unique test.  I reviewed external records: provider visit note from outside specialist.     Category 2:   I independently ordered and interpreted the ED EKG in the absence of a Cardiologist.    I interpreted the X-rays no obvious pneumothorax.  I interpreted the labs.    Category 3: Discussion of management or test interpretation.  Please see ED course above    Risk of Complications and/or Morbidity of Patient Management:  Patient was discharged risks and benefits of hospitalization were considered.  The following clinical decision tools were used in the care of this patient HEART score.  Shared medical decision making was utilized in creating the patients health plan today.  History: 1  EC  Patient Age: 4  Risk Factors: 0  Troponin: 0  Heart Score 
99

## 2024-01-04 NOTE — ED TRIAGE NOTES
Patient arrives via EMS from home with chest pain/palpitations. Patient states this started around mid night. EMS gave 324 asa and nitro in route with some relief.